# Patient Record
Sex: FEMALE | Race: ASIAN | Employment: FULL TIME | URBAN - METROPOLITAN AREA
[De-identification: names, ages, dates, MRNs, and addresses within clinical notes are randomized per-mention and may not be internally consistent; named-entity substitution may affect disease eponyms.]

---

## 2023-12-11 DIAGNOSIS — O36.80X0 PREGNANCY WITH INCONCLUSIVE FETAL VIABILITY, SINGLE OR UNSPECIFIED FETUS: Primary | ICD-10-CM

## 2023-12-20 ENCOUNTER — LAB ENCOUNTER (OUTPATIENT)
Dept: LAB | Facility: HOSPITAL | Age: 33
End: 2023-12-20
Attending: OBSTETRICS & GYNECOLOGY
Payer: COMMERCIAL

## 2023-12-20 ENCOUNTER — INITIAL PRENATAL (OUTPATIENT)
Facility: CLINIC | Age: 33
End: 2023-12-20
Payer: COMMERCIAL

## 2023-12-20 ENCOUNTER — ULTRASOUND ENCOUNTER (OUTPATIENT)
Facility: CLINIC | Age: 33
End: 2023-12-20
Payer: COMMERCIAL

## 2023-12-20 VITALS
HEART RATE: 78 BPM | DIASTOLIC BLOOD PRESSURE: 60 MMHG | BODY MASS INDEX: 22.6 KG/M2 | WEIGHT: 144 LBS | HEIGHT: 67 IN | SYSTOLIC BLOOD PRESSURE: 100 MMHG

## 2023-12-20 DIAGNOSIS — O36.80X0 PREGNANCY WITH INCONCLUSIVE FETAL VIABILITY, SINGLE OR UNSPECIFIED FETUS: ICD-10-CM

## 2023-12-20 DIAGNOSIS — Z34.01 ENCOUNTER FOR SUPERVISION OF NORMAL FIRST PREGNANCY IN FIRST TRIMESTER: Primary | ICD-10-CM

## 2023-12-20 DIAGNOSIS — Z34.01 ENCOUNTER FOR SUPERVISION OF NORMAL FIRST PREGNANCY IN FIRST TRIMESTER: ICD-10-CM

## 2023-12-20 LAB
ANTIBODY SCREEN: NEGATIVE
APPEARANCE: CLEAR
BASOPHILS # BLD AUTO: 0.02 X10(3) UL (ref 0–0.2)
BASOPHILS NFR BLD AUTO: 0.2 %
BILIRUBIN: NEGATIVE
EOSINOPHIL # BLD AUTO: 0.06 X10(3) UL (ref 0–0.7)
EOSINOPHIL NFR BLD AUTO: 0.6 %
ERYTHROCYTE [DISTWIDTH] IN BLOOD BY AUTOMATED COUNT: 12.3 %
GLUCOSE (URINE DIPSTICK): NEGATIVE MG/DL
HBV SURFACE AG SER-ACNC: <0.1 [IU]/L
HBV SURFACE AG SERPL QL IA: NONREACTIVE
HCT VFR BLD AUTO: 34.5 %
HCV AB SERPL QL IA: NONREACTIVE
HGB BLD-MCNC: 12.4 G/DL
IMM GRANULOCYTES # BLD AUTO: 0.08 X10(3) UL (ref 0–1)
IMM GRANULOCYTES NFR BLD: 0.8 %
KETONES (URINE DIPSTICK): NEGATIVE MG/DL
LYMPHOCYTES # BLD AUTO: 2.39 X10(3) UL (ref 1–4)
LYMPHOCYTES NFR BLD AUTO: 24.4 %
MCH RBC QN AUTO: 29.8 PG (ref 26–34)
MCHC RBC AUTO-ENTMCNC: 35.9 G/DL (ref 31–37)
MCV RBC AUTO: 82.9 FL
MONOCYTES # BLD AUTO: 0.44 X10(3) UL (ref 0.1–1)
MONOCYTES NFR BLD AUTO: 4.5 %
MULTISTIX LOT#: ABNORMAL NUMERIC
NEUTROPHILS # BLD AUTO: 6.82 X10 (3) UL (ref 1.5–7.7)
NEUTROPHILS # BLD AUTO: 6.82 X10(3) UL (ref 1.5–7.7)
NEUTROPHILS NFR BLD AUTO: 69.5 %
NITRITE, URINE: NEGATIVE
OCCULT BLOOD: NEGATIVE
PH, URINE: 7 (ref 4.5–8)
PLATELET # BLD AUTO: 231 10(3)UL (ref 150–450)
PROTEIN (URINE DIPSTICK): NEGATIVE MG/DL
RBC # BLD AUTO: 4.16 X10(6)UL
RH BLOOD TYPE: POSITIVE
RUBV IGG SER QL: POSITIVE
RUBV IGG SER-ACNC: 80 IU/ML (ref 10–?)
SPECIFIC GRAVITY: 1.01 (ref 1–1.03)
T PALLIDUM AB SER QL IA: NONREACTIVE
URINE-COLOR: YELLOW
UROBILINOGEN,SEMI-QN: 0.2 MG/DL (ref 0–1.9)
WBC # BLD AUTO: 9.8 X10(3) UL (ref 4–11)

## 2023-12-20 PROCEDURE — 85025 COMPLETE CBC W/AUTO DIFF WBC: CPT

## 2023-12-20 PROCEDURE — 3078F DIAST BP <80 MM HG: CPT | Performed by: OBSTETRICS & GYNECOLOGY

## 2023-12-20 PROCEDURE — 86803 HEPATITIS C AB TEST: CPT

## 2023-12-20 PROCEDURE — 3008F BODY MASS INDEX DOCD: CPT | Performed by: OBSTETRICS & GYNECOLOGY

## 2023-12-20 PROCEDURE — 86780 TREPONEMA PALLIDUM: CPT

## 2023-12-20 PROCEDURE — 86900 BLOOD TYPING SEROLOGIC ABO: CPT

## 2023-12-20 PROCEDURE — 3074F SYST BP LT 130 MM HG: CPT | Performed by: OBSTETRICS & GYNECOLOGY

## 2023-12-20 PROCEDURE — 87591 N.GONORRHOEAE DNA AMP PROB: CPT | Performed by: OBSTETRICS & GYNECOLOGY

## 2023-12-20 PROCEDURE — 86901 BLOOD TYPING SEROLOGIC RH(D): CPT

## 2023-12-20 PROCEDURE — 36415 COLL VENOUS BLD VENIPUNCTURE: CPT

## 2023-12-20 PROCEDURE — 87491 CHLMYD TRACH DNA AMP PROBE: CPT | Performed by: OBSTETRICS & GYNECOLOGY

## 2023-12-20 PROCEDURE — 87389 HIV-1 AG W/HIV-1&-2 AB AG IA: CPT

## 2023-12-20 PROCEDURE — 87086 URINE CULTURE/COLONY COUNT: CPT | Performed by: OBSTETRICS & GYNECOLOGY

## 2023-12-20 PROCEDURE — 76801 OB US < 14 WKS SINGLE FETUS: CPT | Performed by: OBSTETRICS & GYNECOLOGY

## 2023-12-20 PROCEDURE — 81002 URINALYSIS NONAUTO W/O SCOPE: CPT | Performed by: OBSTETRICS & GYNECOLOGY

## 2023-12-20 PROCEDURE — 86850 RBC ANTIBODY SCREEN: CPT

## 2023-12-20 PROCEDURE — 87340 HEPATITIS B SURFACE AG IA: CPT

## 2023-12-20 PROCEDURE — 86762 RUBELLA ANTIBODY: CPT

## 2023-12-20 NOTE — PROGRESS NOTES
New OB  - patient c/o nausea - declines medications  - denies h/o HSV, blood transfusion, hepatitis  - EDC by LMP c/w 9w5d US  - patient had normal pap smear in [place] 9/23 - repeat after delivery  - discussed NIPT and carrier screen - patient will consider

## 2023-12-21 LAB
C TRACH DNA SPEC QL NAA+PROBE: NEGATIVE
N GONORRHOEA DNA SPEC QL NAA+PROBE: NEGATIVE

## 2024-01-18 ENCOUNTER — TELEPHONE (OUTPATIENT)
Facility: CLINIC | Age: 34
End: 2024-01-18

## 2024-01-18 ENCOUNTER — ROUTINE PRENATAL (OUTPATIENT)
Facility: CLINIC | Age: 34
End: 2024-01-18
Payer: COMMERCIAL

## 2024-01-18 VITALS
WEIGHT: 145.19 LBS | DIASTOLIC BLOOD PRESSURE: 56 MMHG | HEIGHT: 67 IN | HEART RATE: 82 BPM | SYSTOLIC BLOOD PRESSURE: 98 MMHG | BODY MASS INDEX: 22.79 KG/M2

## 2024-01-18 DIAGNOSIS — Z34.01 ENCOUNTER FOR SUPERVISION OF NORMAL FIRST PREGNANCY IN FIRST TRIMESTER: Primary | ICD-10-CM

## 2024-01-18 DIAGNOSIS — Z3A.13 13 WEEKS GESTATION OF PREGNANCY: ICD-10-CM

## 2024-01-18 PROCEDURE — 3074F SYST BP LT 130 MM HG: CPT | Performed by: OBSTETRICS & GYNECOLOGY

## 2024-01-18 PROCEDURE — 3078F DIAST BP <80 MM HG: CPT | Performed by: OBSTETRICS & GYNECOLOGY

## 2024-01-18 PROCEDURE — 3008F BODY MASS INDEX DOCD: CPT | Performed by: OBSTETRICS & GYNECOLOGY

## 2024-01-18 NOTE — PROGRESS NOTES
Patient has no complaints    - EDC by LMP c/w 9w5d US  - patient had normal pap smear in [place] 9/23 - repeat after delivery  -  NIPT and carrier screen - today

## 2024-01-18 NOTE — TELEPHONE ENCOUNTER
13 6/7 G1 [place] pregnancy    Pt request for NIPS/carrier lab draw per Dr. Steen     Patients name &  verified on lab tubes with patient. NIPS/carrier screen lab drawn, patient tolerated well. Specimen placed at . INVITAE access, call in 2/4 weeks for result, Cautioned patient may receive results via email from [facility] ?E that includes gender results discussed. Patient verbalized understanding.

## 2024-01-28 LAB
AMB EXT MYRIAD TRISOMY 13: NEGATIVE
AMB EXT MYRIAD TRISOMY 18: NEGATIVE
AMB EXT MYRIAD TRISOMY 21: NEGATIVE

## 2024-01-29 ENCOUNTER — TELEPHONE (OUTPATIENT)
Facility: CLINIC | Age: 34
End: 2024-01-29

## 2024-02-01 LAB
AMB EXT CYSTIC FIBROSIS ALLELE 1: NEGATIVE
AMB EXT SICKLE CELL SOLUBILITY: NEGATIVE

## 2024-02-16 ENCOUNTER — ROUTINE PRENATAL (OUTPATIENT)
Facility: CLINIC | Age: 34
End: 2024-02-16
Payer: COMMERCIAL

## 2024-02-16 VITALS
HEIGHT: 67 IN | WEIGHT: 148 LBS | HEART RATE: 75 BPM | BODY MASS INDEX: 23.23 KG/M2 | DIASTOLIC BLOOD PRESSURE: 58 MMHG | SYSTOLIC BLOOD PRESSURE: 108 MMHG

## 2024-02-16 DIAGNOSIS — Z3A.18 PREGNANCY WITH 18 COMPLETED WEEKS GESTATION: Primary | ICD-10-CM

## 2024-02-16 DIAGNOSIS — Z34.02 ENCOUNTER FOR SUPERVISION OF NORMAL FIRST PREGNANCY IN SECOND TRIMESTER: ICD-10-CM

## 2024-02-16 DIAGNOSIS — Z36.89 ENCOUNTER FOR FETAL ANATOMIC SURVEY (HCC): ICD-10-CM

## 2024-02-16 NOTE — PROGRESS NOTES
ADILSON 18.0    Feel really tired.    No nausea  No LOF, VB, ctx.  Has not felt baby move yet.      - EDC by LMP c/w 9w5d US  - patient had normal pap smear in [place] 9/23 - repeat after delivery  - NIPT and carrier screen WNL  - FOB: Dustin  - Baby boy!  [ ] follow up anatomy scan (ordered 2/16)

## 2024-03-15 ENCOUNTER — ULTRASOUND ENCOUNTER (OUTPATIENT)
Facility: CLINIC | Age: 34
End: 2024-03-15
Payer: COMMERCIAL

## 2024-03-15 ENCOUNTER — ROUTINE PRENATAL (OUTPATIENT)
Facility: CLINIC | Age: 34
End: 2024-03-15
Payer: COMMERCIAL

## 2024-03-15 VITALS
DIASTOLIC BLOOD PRESSURE: 68 MMHG | WEIGHT: 155.25 LBS | BODY MASS INDEX: 24.37 KG/M2 | SYSTOLIC BLOOD PRESSURE: 106 MMHG | HEART RATE: 67 BPM | HEIGHT: 67 IN

## 2024-03-15 DIAGNOSIS — Z34.90 PRENATAL CARE, ANTEPARTUM (HCC): Primary | ICD-10-CM

## 2024-03-15 DIAGNOSIS — Z3A.18: ICD-10-CM

## 2024-03-15 PROCEDURE — 76805 OB US >/= 14 WKS SNGL FETUS: CPT | Performed by: STUDENT IN AN ORGANIZED HEALTH CARE EDUCATION/TRAINING PROGRAM

## 2024-03-15 NOTE — PROGRESS NOTES
ADILSON - 22w0d     Pt doing well overall  Has low back pain, sciatica pain. Offered PT, pt declines for now  Feeling fetal movement    - EDC by LMP c/w 9w5d US  - patient had normal pap smear in [place] 9/23 - repeat after delivery  - NIPT and carrier screen WNL  - FOB: Dustin  - Baby boy!  - anatomy US done today but images not uploaded to Epic yet, per sonographer there was intracardiac echogenic focus but otherwise normal (ICEF clinically not significant with normal NIPT)

## 2024-03-18 ENCOUNTER — TELEPHONE (OUTPATIENT)
Dept: ADMINISTRATIVE | Age: 34
End: 2024-03-18

## 2024-03-22 NOTE — TELEPHONE ENCOUNTER
FMLA ([employer]) with incomplete AGUS/FCR (missing fax number) and disability form ([employer]) with valid authorization received at Forms Department on 3-15/2024. Logged for processing.    - On AGUS/FCR for [employer], PT states she wants form to be uploaded via [employer].

## 2024-03-25 NOTE — TELEPHONE ENCOUNTER
Hu Smallwood, Dr. Lucero -      Please sign off on these 2 forms if you agree to:      STD - Maternity Leave       Starting: By or near 2024 (estimated due date)       Ending:  Pending recovery - 6 weeks vaginal / 8 weeks     FMLA - Maternity Leave       Starting: By or near 2024 (estimated due date)       Ending: Pending recovery - 6 weeks vaginal / 8 weeks     (place your signature on the first page only)     -From your Inbasket, Highlight the patient and click \"Chart\"   -Double click the 3/18/2024 Forms Completion telephone encounter  -Scroll down to the Media section   -Click the blue Hyperlinks:     STD / Dr. Lucero / 3-    FMLA / Dr. Lucero / 3-    -Click Acknowledge located in the top right ribbon/menu (it  has been moved)  -Drag the mouse into the blank space of the document and a + sign will   appear. Left click to electronically sign the document.     Thank you for your time and assistance!     Tavia Laughlin Department

## 2024-04-11 PROBLEM — R87.610 ATYPICAL SQUAMOUS CELL CHANGES OF UNDETERMINED SIGNIFICANCE (ASCUS) ON CERVICAL CYTOLOGY WITH POSITIVE HIGH RISK HUMAN PAPILLOMA VIRUS (HPV): Status: ACTIVE | Noted: 2022-01-12

## 2024-04-11 PROBLEM — O26.892 LOW BACK PAIN DURING PREGNANCY IN SECOND TRIMESTER (HCC): Status: ACTIVE | Noted: 2024-04-11

## 2024-04-11 PROBLEM — O99.891 DISORDER OF MUSCULOSKELETAL SYSTEM DURING PREGNANCY (HCC): Status: ACTIVE | Noted: 2024-04-11

## 2024-04-11 PROBLEM — M54.50 LOW BACK PAIN DURING PREGNANCY IN SECOND TRIMESTER (HCC): Status: ACTIVE | Noted: 2024-04-11

## 2024-04-11 PROBLEM — R87.810 ATYPICAL SQUAMOUS CELL CHANGES OF UNDETERMINED SIGNIFICANCE (ASCUS) ON CERVICAL CYTOLOGY WITH POSITIVE HIGH RISK HUMAN PAPILLOMA VIRUS (HPV): Status: ACTIVE | Noted: 2022-01-12

## 2024-04-11 PROBLEM — Z34.02 PREGNANCY, FIRST, SECOND TRIMESTER (HCC): Status: ACTIVE | Noted: 2024-04-11

## 2024-04-12 ENCOUNTER — ROUTINE PRENATAL (OUTPATIENT)
Facility: CLINIC | Age: 34
End: 2024-04-12
Payer: COMMERCIAL

## 2024-04-12 VITALS
DIASTOLIC BLOOD PRESSURE: 62 MMHG | HEIGHT: 67 IN | WEIGHT: 163.19 LBS | HEART RATE: 89 BPM | BODY MASS INDEX: 25.61 KG/M2 | SYSTOLIC BLOOD PRESSURE: 100 MMHG

## 2024-04-12 DIAGNOSIS — Z11.4 SCREENING FOR HIV (HUMAN IMMUNODEFICIENCY VIRUS): ICD-10-CM

## 2024-04-12 DIAGNOSIS — Z13.0 SCREENING, ANEMIA, DEFICIENCY, IRON: ICD-10-CM

## 2024-04-12 DIAGNOSIS — Z34.02 PREGNANCY, FIRST, SECOND TRIMESTER (HCC): ICD-10-CM

## 2024-04-12 DIAGNOSIS — Z13.1 SCREENING FOR DIABETES MELLITUS: ICD-10-CM

## 2024-04-12 DIAGNOSIS — J06.9 UPPER RESPIRATORY TRACT INFECTION, UNSPECIFIED TYPE: ICD-10-CM

## 2024-04-12 DIAGNOSIS — M54.50 LOW BACK PAIN DURING PREGNANCY IN SECOND TRIMESTER (HCC): Primary | ICD-10-CM

## 2024-04-12 DIAGNOSIS — O26.892 LOW BACK PAIN DURING PREGNANCY IN SECOND TRIMESTER (HCC): Primary | ICD-10-CM

## 2024-04-12 DIAGNOSIS — O99.891 DISORDER OF MUSCULOSKELETAL SYSTEM DURING PREGNANCY (HCC): ICD-10-CM

## 2024-04-12 NOTE — PATIENT INSTRUCTIONS
Next blood work at 27-28 wk    Third trimester HIV testing  [place] law mandates every pregnant woman is tested for HIV once at the start of prenatal care and again in the 3rd trimester (27 weeks 0 days and beyond).     Tdap (Tetanus, Diptheria, Pertussis)   -booster shot for pertussis (whooping cough)  -recommended by the CDC (Centers for Disease Control) for every pregnant woman in the third trimester (28 weeks and beyond)  -the purpose of giving the vaccine during pregnancy is so that the mother can share her antibodies with the fetus across the placenta  -it is recommended to take this vaccine early in the third trimester so that your body has enough time to produce the antibodies that can pass across the placenta to the fetus  -the infant cannot be vaccinated for pertussis until 2 months of age due to an immature immune system and lack of response to the vaccine prior to that time.   -the father of the baby as well as grandparents, other caregivers, etc should receive a booster shot for whooping cough as well (vaccination at least 1 time after the age of 18 is sufficient)     [facility] Prenatal Classes (and virtual tour of Labor & Delivery unit)  [email or web address]  [phone]    Pre-registration for the hospital  [email or web address]    Breast pump  -contact your insurance to request them to send an order to us for their preferred medical supply company  Or  -contact [employer] (flyer available on the lobby wall across from reception desk)   [email or web address]    Car seat *MUST* be installed before infant(s) can be leave the hospital    Doctor for baby   *Please select a pediatrician or family medicine provider BEFORE you are admitted to the hospital to give birth.   Pediatrics (birth-18 years of age) or Family Medicine (birth through adulthood)  Make sure that provider accepts your insurance.   Pick a provider that is close to where  you live.  If the provider is on staff at [facility], the nursing staff will notify them when your infant is born so they are aware to come to the hospital to examine the infant.   If the provider you have chosen is not on staff at [facility], a pediatric hospitalist will care for your infant during the hospitalization only. You must arrange the follow up visit with your provider.   After delivery at the hospital follow up visit instructions for baby will be provided prior to discharge.     The baby will not be able to leave the hospital without a follow up visit scheduled.     To establish care:  [email or web address]  Or   [facility] Physician Referral line at [phone]    There are MANY providers available. It would be impossible to list them all, but here are a few popular pediatrics groups in [place]:    [facility] [place] [phone]  [facility] [place] [phone]  [facility] [place] [phone]  [facility] [place] [phone]  [facility] [place] [phone]  [facility] [phone]    There are also many wonderful independent practitioners as well. We recommend you speak with family, friends, colleagues as personal recommendations can be very helpful.

## 2024-04-12 NOTE — PROGRESS NOTES
ADILSON    Chief Complaint   Patient presents with    Prenatal Care     ADILSON-26w 0d    Patient reports she currently has cough,sore throat, congestion that started on 24. Patient reports baby is very active.      Partner here - he says he was sick   +FM   C/o cough, sore throat, congestion starting 24   Has NOT seen PCP or Urgent care  Has NOT had testing for COVID or Flu  Symptoms are improving  Fever N  Patient is actively still having some coughing in the room  Taking nothing for her symptoms. Does have Robitussin DM though that she could take   Probably Viral URI.     33 year old  at 26w0d  KIMMIE 24 by LMP c/w 9w5d US  O+    - NIPS and carrier screen WNL  - FOB: Dustin  - Baby boy!  -Normal anatomy ultrasound. Isolated echogenic intracardiac focus - in context of normal NIPT testing, this is not clinically significant.   -Tdap discussed  -1 hr GTT, CBC, 3rd trim HIV discussed & ordered  -classes, pre-registration, pediatrician, breast pump, car seat discussed    Low back pain, sciatica  -PT declined    Patient had normal pap smear in [place]  - repeat after delivery    RTC 2 wk

## 2024-04-20 ENCOUNTER — LAB ENCOUNTER (OUTPATIENT)
Dept: LAB | Facility: HOSPITAL | Age: 34
End: 2024-04-20
Attending: OBSTETRICS & GYNECOLOGY
Payer: COMMERCIAL

## 2024-04-20 DIAGNOSIS — Z11.3 SCREENING EXAMINATION FOR STD (SEXUALLY TRANSMITTED DISEASE): ICD-10-CM

## 2024-04-20 DIAGNOSIS — Z13.1 SCREENING FOR DIABETES MELLITUS: ICD-10-CM

## 2024-04-20 DIAGNOSIS — Z11.4 SCREENING FOR HIV (HUMAN IMMUNODEFICIENCY VIRUS): ICD-10-CM

## 2024-04-20 DIAGNOSIS — Z13.0 SCREENING, ANEMIA, DEFICIENCY, IRON: ICD-10-CM

## 2024-04-20 LAB
BASOPHILS # BLD AUTO: 0.06 X10(3) UL (ref 0–0.2)
BASOPHILS NFR BLD AUTO: 0.6 %
EOSINOPHIL # BLD AUTO: 0.07 X10(3) UL (ref 0–0.7)
EOSINOPHIL NFR BLD AUTO: 0.7 %
ERYTHROCYTE [DISTWIDTH] IN BLOOD BY AUTOMATED COUNT: 13.1 %
GLUCOSE 1H P GLC SERPL-MCNC: 121 MG/DL
HCT VFR BLD AUTO: 31.2 %
HGB BLD-MCNC: 10.8 G/DL
IMM GRANULOCYTES # BLD AUTO: 0.46 X10(3) UL (ref 0–1)
IMM GRANULOCYTES NFR BLD: 4.3 %
LYMPHOCYTES # BLD AUTO: 1.55 X10(3) UL (ref 1–4)
LYMPHOCYTES NFR BLD AUTO: 14.4 %
MCH RBC QN AUTO: 30.5 PG (ref 26–34)
MCHC RBC AUTO-ENTMCNC: 34.6 G/DL (ref 31–37)
MCV RBC AUTO: 88.1 FL
MONOCYTES # BLD AUTO: 0.43 X10(3) UL (ref 0.1–1)
MONOCYTES NFR BLD AUTO: 4 %
NEUTROPHILS # BLD AUTO: 8.17 X10 (3) UL (ref 1.5–7.7)
NEUTROPHILS # BLD AUTO: 8.17 X10(3) UL (ref 1.5–7.7)
NEUTROPHILS NFR BLD AUTO: 76 %
PLATELET # BLD AUTO: 214 10(3)UL (ref 150–450)
RBC # BLD AUTO: 3.54 X10(6)UL
WBC # BLD AUTO: 10.7 X10(3) UL (ref 4–11)

## 2024-04-20 PROCEDURE — 36415 COLL VENOUS BLD VENIPUNCTURE: CPT

## 2024-04-20 PROCEDURE — 82950 GLUCOSE TEST: CPT

## 2024-04-20 PROCEDURE — 85025 COMPLETE CBC W/AUTO DIFF WBC: CPT

## 2024-04-20 PROCEDURE — 86780 TREPONEMA PALLIDUM: CPT

## 2024-04-20 PROCEDURE — 87389 HIV-1 AG W/HIV-1&-2 AB AG IA: CPT

## 2024-04-21 PROBLEM — O99.012 ANEMIA AFFECTING PREGNANCY IN SECOND TRIMESTER (HCC): Status: ACTIVE | Noted: 2024-04-21

## 2024-04-21 LAB — T PALLIDUM AB SER QL IA: NONREACTIVE

## 2024-04-22 ENCOUNTER — LAB ENCOUNTER (OUTPATIENT)
Dept: LAB | Facility: HOSPITAL | Age: 34
End: 2024-04-22
Attending: OBSTETRICS & GYNECOLOGY
Payer: COMMERCIAL

## 2024-04-22 DIAGNOSIS — Z13.0 SCREENING, ANEMIA, DEFICIENCY, IRON: ICD-10-CM

## 2024-04-22 DIAGNOSIS — O99.012 ANEMIA AFFECTING PREGNANCY IN SECOND TRIMESTER (HCC): ICD-10-CM

## 2024-04-22 PROBLEM — E61.1 IRON DEFICIENCY: Status: ACTIVE | Noted: 2024-04-22

## 2024-04-22 LAB
BASOPHILS # BLD AUTO: 0.08 X10(3) UL (ref 0–0.2)
BASOPHILS NFR BLD AUTO: 0.7 %
DEPRECATED HBV CORE AB SER IA-ACNC: 13.3 NG/ML
EOSINOPHIL # BLD AUTO: 0.05 X10(3) UL (ref 0–0.7)
EOSINOPHIL NFR BLD AUTO: 0.4 %
ERYTHROCYTE [DISTWIDTH] IN BLOOD BY AUTOMATED COUNT: 13.2 %
HCT VFR BLD AUTO: 33.8 %
HGB BLD-MCNC: 11.3 G/DL
IMM GRANULOCYTES # BLD AUTO: 0.49 X10(3) UL (ref 0–1)
IMM GRANULOCYTES NFR BLD: 4 %
LYMPHOCYTES # BLD AUTO: 1.83 X10(3) UL (ref 1–4)
LYMPHOCYTES NFR BLD AUTO: 15.1 %
MCH RBC QN AUTO: 30.4 PG (ref 26–34)
MCHC RBC AUTO-ENTMCNC: 33.4 G/DL (ref 31–37)
MCV RBC AUTO: 90.9 FL
MONOCYTES # BLD AUTO: 0.59 X10(3) UL (ref 0.1–1)
MONOCYTES NFR BLD AUTO: 4.9 %
NEUTROPHILS # BLD AUTO: 9.11 X10 (3) UL (ref 1.5–7.7)
NEUTROPHILS # BLD AUTO: 9.11 X10(3) UL (ref 1.5–7.7)
NEUTROPHILS NFR BLD AUTO: 74.9 %
PLATELET # BLD AUTO: 228 10(3)UL (ref 150–450)
RBC # BLD AUTO: 3.72 X10(6)UL
WBC # BLD AUTO: 12.2 X10(3) UL (ref 4–11)

## 2024-04-22 PROCEDURE — 85025 COMPLETE CBC W/AUTO DIFF WBC: CPT

## 2024-04-22 PROCEDURE — 82728 ASSAY OF FERRITIN: CPT

## 2024-04-22 PROCEDURE — 36415 COLL VENOUS BLD VENIPUNCTURE: CPT

## 2024-04-26 ENCOUNTER — ROUTINE PRENATAL (OUTPATIENT)
Facility: CLINIC | Age: 34
End: 2024-04-26
Payer: COMMERCIAL

## 2024-04-26 VITALS
WEIGHT: 170 LBS | BODY MASS INDEX: 26.68 KG/M2 | DIASTOLIC BLOOD PRESSURE: 74 MMHG | SYSTOLIC BLOOD PRESSURE: 110 MMHG | HEART RATE: 82 BPM | HEIGHT: 67 IN

## 2024-04-26 DIAGNOSIS — D50.9 IRON DEFICIENCY ANEMIA DURING PREGNANCY (HCC): ICD-10-CM

## 2024-04-26 DIAGNOSIS — O99.019 IRON DEFICIENCY ANEMIA DURING PREGNANCY (HCC): ICD-10-CM

## 2024-04-26 DIAGNOSIS — Z34.90 PRENATAL CARE, ANTEPARTUM (HCC): ICD-10-CM

## 2024-04-26 DIAGNOSIS — Z3A.28 28 WEEKS GESTATION OF PREGNANCY (HCC): Primary | ICD-10-CM

## 2024-04-26 PROCEDURE — 90471 IMMUNIZATION ADMIN: CPT | Performed by: STUDENT IN AN ORGANIZED HEALTH CARE EDUCATION/TRAINING PROGRAM

## 2024-04-26 PROCEDURE — 90715 TDAP VACCINE 7 YRS/> IM: CPT | Performed by: STUDENT IN AN ORGANIZED HEALTH CARE EDUCATION/TRAINING PROGRAM

## 2024-04-26 RX ORDER — PNV NO.95/FERROUS FUM/FOLIC AC 28MG-0.8MG
TABLET ORAL
COMMUNITY

## 2024-04-26 NOTE — PROGRESS NOTES
ADILSON 28.0  +FM, no LOF, no VB, no ctx.      Chief Complaint   Patient presents with    Prenatal Care     ADILSON 28w 0d  No complaints      Partner here - he says he was sick   +FM   C/o cough, sore throat, congestion starting 24   Has NOT seen PCP or Urgent care  Has NOT had testing for COVID or Flu  Symptoms are improving  Fever N  Patient is actively still having some coughing in the room  Taking nothing for her symptoms. Does have Robitussin DM though that she could take   Probably Viral URI.     33 year old  at 26w0d  KIMMIE 24 by LMP c/w 9w5d US  O+    - NIPS and carrier screen WNL  - FOB: Dustin  - Baby boy!  -Normal anatomy ultrasound. Isolated echogenic intracardiac focus - in context of normal NIPT testing, this is not clinically significant.   -Tdap today  -classes, pre-registration, pediatrician, breast pump, car seat discussed    Low back pain, sciatica  -PT declined    Iron deficiency anemia  -hgb 11.3, ferritin     Patient had normal pap smear in [place]  - repeat after delivery    RTC 2 wk

## 2024-05-07 ENCOUNTER — TELEPHONE (OUTPATIENT)
Facility: CLINIC | Age: 34
End: 2024-05-07

## 2024-05-07 NOTE — TELEPHONE ENCOUNTER
Breast Pump order was received from [employer].  Order form was placed in Dr. Nicole Lucero's bin for signature.

## 2024-05-10 ENCOUNTER — ROUTINE PRENATAL (OUTPATIENT)
Facility: CLINIC | Age: 34
End: 2024-05-10
Payer: COMMERCIAL

## 2024-05-10 VITALS
WEIGHT: 171 LBS | SYSTOLIC BLOOD PRESSURE: 98 MMHG | HEART RATE: 71 BPM | BODY MASS INDEX: 26.84 KG/M2 | HEIGHT: 67 IN | DIASTOLIC BLOOD PRESSURE: 66 MMHG

## 2024-05-10 DIAGNOSIS — Z34.90 PRENATAL CARE, ANTEPARTUM (HCC): Primary | ICD-10-CM

## 2024-05-10 NOTE — PROGRESS NOTES
ADILSON   It is hard to sleep sometimes because the baby is moving so much at night - reassured      33 year old  at 30w0d   KIMMIE 24 by LMP c/w 9w5d US  O+    - NIPS and carrier screen WNL  - FOB: Dustin  - Baby boy!  -Normal anatomy ultrasound. Isolated echogenic intracardiac focus - in context of normal NIPT testing, this is not clinically significant.   -1h GTT, HIV, syphilis screen negative  -Tdap done  -classes, pre-registration, pediatrician, breast pump, car seat discussed    Low back pain, sciatica  -PT declined    Iron deficiency anemia  -hgb 10.8 on , ferritin 13 - advised supplemental PO Fe, she is taking    Patient had normal pap smear in [place]  - repeat after delivery    RTC 2 wk

## 2024-05-20 ENCOUNTER — LAB ENCOUNTER (OUTPATIENT)
Dept: LAB | Facility: HOSPITAL | Age: 34
End: 2024-05-20
Attending: OBSTETRICS & GYNECOLOGY

## 2024-05-20 DIAGNOSIS — E61.1 IRON DEFICIENCY: ICD-10-CM

## 2024-05-20 DIAGNOSIS — Z11.3 SCREENING EXAMINATION FOR STD (SEXUALLY TRANSMITTED DISEASE): ICD-10-CM

## 2024-05-20 LAB
BASOPHILS # BLD AUTO: 0.06 X10(3) UL (ref 0–0.2)
BASOPHILS NFR BLD AUTO: 0.6 %
EOSINOPHIL # BLD AUTO: 0.06 X10(3) UL (ref 0–0.7)
EOSINOPHIL NFR BLD AUTO: 0.6 %
ERYTHROCYTE [DISTWIDTH] IN BLOOD BY AUTOMATED COUNT: 13.2 %
HCT VFR BLD AUTO: 37.3 %
HGB BLD-MCNC: 12.4 G/DL
IMM GRANULOCYTES # BLD AUTO: 0.41 X10(3) UL (ref 0–1)
IMM GRANULOCYTES NFR BLD: 4 %
LYMPHOCYTES # BLD AUTO: 1.48 X10(3) UL (ref 1–4)
LYMPHOCYTES NFR BLD AUTO: 14.6 %
MCH RBC QN AUTO: 30.6 PG (ref 26–34)
MCHC RBC AUTO-ENTMCNC: 33.2 G/DL (ref 31–37)
MCV RBC AUTO: 92.1 FL
MONOCYTES # BLD AUTO: 0.55 X10(3) UL (ref 0.1–1)
MONOCYTES NFR BLD AUTO: 5.4 %
NEUTROPHILS # BLD AUTO: 7.61 X10 (3) UL (ref 1.5–7.7)
NEUTROPHILS # BLD AUTO: 7.61 X10(3) UL (ref 1.5–7.7)
NEUTROPHILS NFR BLD AUTO: 74.8 %
PLATELET # BLD AUTO: 198 10(3)UL (ref 150–450)
RBC # BLD AUTO: 4.05 X10(6)UL
T PALLIDUM AB SER QL IA: NONREACTIVE
WBC # BLD AUTO: 10.2 X10(3) UL (ref 4–11)

## 2024-05-20 PROCEDURE — 36415 COLL VENOUS BLD VENIPUNCTURE: CPT

## 2024-05-20 PROCEDURE — 85025 COMPLETE CBC W/AUTO DIFF WBC: CPT

## 2024-05-20 PROCEDURE — 86780 TREPONEMA PALLIDUM: CPT

## 2024-05-24 ENCOUNTER — ROUTINE PRENATAL (OUTPATIENT)
Facility: CLINIC | Age: 34
End: 2024-05-24

## 2024-05-24 VITALS
WEIGHT: 173 LBS | SYSTOLIC BLOOD PRESSURE: 98 MMHG | HEART RATE: 97 BPM | BODY MASS INDEX: 27.15 KG/M2 | HEIGHT: 67 IN | DIASTOLIC BLOOD PRESSURE: 60 MMHG

## 2024-05-24 DIAGNOSIS — Z34.90 PRENATAL CARE, ANTEPARTUM (HCC): Primary | ICD-10-CM

## 2024-05-24 NOTE — PROGRESS NOTES
ADILSON   Sharp pain in pubic area with certain movements, gets better with walking and yoga ball - reassured  Baby active  Taking unisom for sleep - reassured this is OK    33 year old  at 32w0d   KIMMIE 24 by LMP c/w 9w5d US  O+    - NIPS and carrier screen WNL  - FOB: Dustin  - Baby boy!  -Normal anatomy ultrasound. Isolated echogenic intracardiac focus - in context of normal NIPT testing, this is not clinically significant.   -1h GTT, HIV, syphilis screen negative  -Tdap done  -classes, pre-registration, pediatrician, breast pump, car seat discussed    Low back pain, sciatica  -PT declined    Iron deficiency anemia  -hgb 10.8 on , ferritin 13 - advised supplemental PO Fe, she is taking    Patient had normal pap smear in [place]  - repeat after delivery    RTC 2 wk

## 2024-06-07 ENCOUNTER — ROUTINE PRENATAL (OUTPATIENT)
Facility: CLINIC | Age: 34
End: 2024-06-07
Payer: COMMERCIAL

## 2024-06-07 VITALS
HEART RATE: 102 BPM | BODY MASS INDEX: 27.37 KG/M2 | WEIGHT: 174.38 LBS | SYSTOLIC BLOOD PRESSURE: 120 MMHG | DIASTOLIC BLOOD PRESSURE: 74 MMHG | HEIGHT: 67 IN

## 2024-06-07 DIAGNOSIS — Z34.90 PRENATAL CARE, ANTEPARTUM (HCC): ICD-10-CM

## 2024-06-07 DIAGNOSIS — L50.9 URTICARIA: ICD-10-CM

## 2024-06-07 DIAGNOSIS — Z3A.34 34 WEEKS GESTATION OF PREGNANCY (HCC): Primary | ICD-10-CM

## 2024-06-07 RX ORDER — HYDROXYZINE HYDROCHLORIDE 25 MG/1
25 TABLET, FILM COATED ORAL 3 TIMES DAILY PRN
Qty: 30 TABLET | Refills: 0 | Status: SHIPPED | OUTPATIENT
Start: 2024-06-07

## 2024-06-07 NOTE — PROGRESS NOTES
ADILSON 34.0  Denies LOF, VB, ctx/cramping  +FM  Denies HA, VC, CP, SOB, RUQ pain  No heartburn, lots of movement near her diaphragm   Today patient is concern about  -rash in her hip areas - cortisol     33 year old  at 32w0d   KIMMIE 24 by LMP c/w 9w5d US  O+    - NIPS and carrier screen WNL  - FOB: Dustin  - Baby boy!  -Normal anatomy ultrasound. Isolated echogenic intracardiac focus - in context of normal NIPT testing, this is not clinically significant.   -1h GTT, HIV, syphilis screen negative  -Tdap done  -classes, pre-registration, pediatrician, breast pump, car seat discussed    Low back pain, sciatica  -PT declined    Iron deficiency anemia  -hgb 10.8 on , ferritin 13 - advised supplemental PO Fe, she is taking    Urticaria   - intermittent on bilateral hips - possible heat rash?  Not seen today in clinic but seen on photos and raised hives seen localized.  - hydroxizine sent to pharmacy     Patient had normal pap smear in [place]  - repeat after delivery    RTC 2 wk

## 2024-06-21 ENCOUNTER — ROUTINE PRENATAL (OUTPATIENT)
Facility: CLINIC | Age: 34
End: 2024-06-21

## 2024-06-21 VITALS
HEIGHT: 67 IN | DIASTOLIC BLOOD PRESSURE: 82 MMHG | SYSTOLIC BLOOD PRESSURE: 104 MMHG | BODY MASS INDEX: 27.62 KG/M2 | HEART RATE: 74 BPM | WEIGHT: 176 LBS

## 2024-06-21 DIAGNOSIS — E61.1 IRON DEFICIENCY: ICD-10-CM

## 2024-06-21 DIAGNOSIS — Z34.03 PREGNANCY, FIRST, THIRD TRIMESTER (HCC): ICD-10-CM

## 2024-06-21 DIAGNOSIS — O36.5930 POOR FETAL GROWTH AFFECTING MANAGEMENT OF MOTHER IN THIRD TRIMESTER, SINGLE OR UNSPECIFIED FETUS (HCC): Primary | ICD-10-CM

## 2024-06-21 DIAGNOSIS — O99.891 DISORDER OF MUSCULOSKELETAL SYSTEM DURING PREGNANCY (HCC): ICD-10-CM

## 2024-06-21 DIAGNOSIS — O26.03 EXCESSIVE WEIGHT GAIN DURING PREGNANCY IN THIRD TRIMESTER (HCC): ICD-10-CM

## 2024-06-21 DIAGNOSIS — O99.013 ANEMIA AFFECTING PREGNANCY IN THIRD TRIMESTER (HCC): ICD-10-CM

## 2024-06-21 PROCEDURE — 87081 CULTURE SCREEN ONLY: CPT | Performed by: OBSTETRICS & GYNECOLOGY

## 2024-06-21 PROCEDURE — 87150 DNA/RNA AMPLIFIED PROBE: CPT | Performed by: OBSTETRICS & GYNECOLOGY

## 2024-06-21 NOTE — PATIENT INSTRUCTIONS
Consider induction of labor at 39-40 wk  -Recent studies show improved outcomes for mothers and infants in most* pregnancies with delivery at 39-40 wk gestation. (*Excluding pre-eclampsia, fetal growth restriction, twins, etc)   -Fetal lung maturity should be adequate.   -The longer the pregnancy progresses, the fetus will continue to gain weight and will become relatively larger than it could be to the mother   -The longer the pregnancy progresses, the placenta will continue to age and become less efficient  -The longer the pregnancy progresses, there is increased risk for developing gestational hypertension/pre-eclampsia.     -\"Larger fetus\" is relative. Fetus has to be compatible with maternal pelvis. No one can predict this. Can only know while the patient is in labor  -Larger fetuses and placental insufficiency (too inefficient to adequately support the fetus with oxygen, nutrients, hydration) are major risk factors for  section.   -A fetus that cannot get its oxygen needs met during labor will manifest fetal distress/intolerance to labor and will require  section if remote from delivery  -A fetus that is larger and/or has broader shoulders/larger chest & abdominal circumference is at increased risk of shoulder dystocia, during which the fetal shoulder gets stuck on the maternal pubic bone. This can result in fetal and maternal injuries.   Potential fetal injuries:  -nerve damage to arm and neck (mainly temporary, rarely permanent)  -broken arm or clavicle  -low oxygen level that can result in brain damage, cerebral palsy, fetal/ death  Potential maternal injuries:   -third or fourth degree perineal lacerations  -tailbone injury  -need for emergent  section (higher risk for infection, hemorrhage, organ damage)   -A fetus that is too large to pass through the maternal bony pelvis will be delivered via unscheduled  section with higher risk of maternal and fetal infection  and hemorrhage.   -There is a lower  rate for inductions between 39-40 weeks compared with expectant management at that time.    [facility] Prenatal Classes (and virtual tour of Labor & Delivery unit)  [email or web address]  [phone]    Pre-registration for the hospital  [email or web address]    Breast pump  -contact your insurance to request them to send an order to us for their preferred medical supply company  Or  -contact [employer] (flyer available on the lobby wall across from reception desk)   [email or web address]    Car seat *MUST* be installed before infant(s) can be leave the hospital    Doctor for baby   *Please select a pediatrician or family medicine provider BEFORE you are admitted to the hospital to give birth.   Pediatrics (birth-18 years of age) or Family Medicine (birth through adulthood)  Make sure that provider accepts your insurance.   Pick a provider that is close to where you live.  If the provider is on staff at [facility], the nursing staff will notify them when your infant is born so they are aware to come to the hospital to examine the infant.   If the provider you have chosen is not on staff at [facility], a pediatric hospitalist will care for your infant during the hospitalization only. You must arrange the follow up visit with your provider.   After delivery at the hospital follow up visit instructions for baby will be provided prior to discharge.     The baby will not be able to leave the hospital without a follow up visit scheduled.     To establish care:  [email or web address]  Or   [facility] Physician Referral line at [phone]    There are MANY providers available. It would be impossible to list them all, but here are a few popular pediatrics groups in [place]:    [facility] [place] [phone]   [facility] [place] [phone]  [facility] [place] [phone]  [facility] [place] [phone]  [facility] [place] [phone]  [facility] [phone]    There are also many wonderful independent practitioners as well. We recommend you speak with family, friends, colleagues as personal recommendations can be very helpful.

## 2024-06-21 NOTE — PROGRESS NOTES
ADILSON    Chaperone declined     Partner here    +FM - baby is calmer after meals. Tightenings mild & intermittent. Lots of pelvic pressure. No leaking fluid. No vaginal bleeding. Even prior to pregnancy does see occasionally some sparkles in vision. Does have migraines with sparkle vision changes.     Vitals:    24 1249   BP: 104/82   Pulse: 74   Weight: 176 lb (79.8 kg)   Height: 67\"      33 year old  at 36w0d   KIMMIE 24 by LMP c/w 9w5d US  O+    - FOB: Dustin  - Baby boy!  - NIPS and carrier screen WNL  -Normal anatomy ultrasound. Isolated echogenic intracardiac focus - in context of normal NIPT testing, this is not clinically significant.   -Tdap done   -1 hr GTT, CBC, 3rd trim HIV & syphilis done.   -classes, pre-registration, pediatrician, breast pump, car seat discussed  -GBS collected. Cervix closed, thick. Fetal head very low. Mod consistency     IOL at 39-40 wk discussed. Will consider. Will see how US looks.      Small for dates, 2024   -NST reactive. Weekly NST (can skip if growth US reassuring)   -growth US ordered     Excessive weight gain  -wt gain goal 25-35 lb. Up 40 lb  -Cautioned regarding increased risk of fetal macrosomia, birth injury for fetus and mother, need for  section.    Low back pain, sciatica  -PT declined    Iron deficiency anemia  -4/20 hgb 10.8, ferritin 13 - advised supplemental PO Fe, she is taking  5/20 Hb 12.4     Urticaria   - intermittent on bilateral hips - possible heat rash?  Not seen today in clinic but seen on photos and raised hives seen localized.  - hydroxyzine sent to pharmacy     Patient had normal pap smear in [place]  - repeat after delivery    RTC 1 wk with NST (can skip NST if growth US is reassuring)

## 2024-06-23 LAB — GROUP B STREP BY PCR FOR PCR OVT: NEGATIVE

## 2024-06-24 ENCOUNTER — ULTRASOUND ENCOUNTER (OUTPATIENT)
Facility: CLINIC | Age: 34
End: 2024-06-24

## 2024-06-28 ENCOUNTER — TELEPHONE (OUTPATIENT)
Facility: CLINIC | Age: 34
End: 2024-06-28

## 2024-06-28 ENCOUNTER — ROUTINE PRENATAL (OUTPATIENT)
Facility: CLINIC | Age: 34
End: 2024-06-28

## 2024-06-28 VITALS
WEIGHT: 177 LBS | HEIGHT: 67 IN | HEART RATE: 72 BPM | DIASTOLIC BLOOD PRESSURE: 70 MMHG | BODY MASS INDEX: 27.78 KG/M2 | SYSTOLIC BLOOD PRESSURE: 102 MMHG

## 2024-06-28 DIAGNOSIS — Z34.03 PREGNANCY, FIRST, THIRD TRIMESTER (HCC): ICD-10-CM

## 2024-06-28 DIAGNOSIS — O99.013 ANEMIA AFFECTING PREGNANCY IN THIRD TRIMESTER (HCC): ICD-10-CM

## 2024-06-28 DIAGNOSIS — O26.03 EXCESSIVE WEIGHT GAIN DURING PREGNANCY IN THIRD TRIMESTER (HCC): Primary | ICD-10-CM

## 2024-06-28 DIAGNOSIS — O36.5930 POOR FETAL GROWTH AFFECTING MANAGEMENT OF MOTHER IN THIRD TRIMESTER, SINGLE OR UNSPECIFIED FETUS (HCC): ICD-10-CM

## 2024-06-28 DIAGNOSIS — E61.1 IRON DEFICIENCY: ICD-10-CM

## 2024-06-28 PROBLEM — J06.9 UPPER RESPIRATORY TRACT INFECTION: Status: RESOLVED | Noted: 2024-04-12 | Resolved: 2024-06-28

## 2024-06-28 NOTE — TELEPHONE ENCOUNTER
----- Message from Dionne VANEGAS sent at 6/28/2024 12:46 PM CDT -----  Regarding: FW: Please schedule PM Cytotec IOL for KIMMIE (40 wk) or a day or two before. Would be for dates/postdates.    ----- Message -----  From: Fatmata Gonzalez MD  Sent: 6/28/2024  12:43 PM CDT  To: [facility]2/[facility]  Subject: Please schedule PM Cytotec IOL for KIMMIE (40 w#

## 2024-06-28 NOTE — PROGRESS NOTES
ADILSON    Chaperone declined     Partner here     +FM - fetus more active this week. Sharp pulling pains right side. Tightenings mild & intermittent. Lots of pelvic pressure. No leaking fluid. No vaginal bleeding. Even prior to pregnancy does see occasionally some sparkles in vision. Does have migraines with aura - sparkle vision changes. A little HA this morning. Didn't sleep well. Fetus was moving a lot.     Vitals:    24 1201   BP: 102/70   Pulse: 72   Weight: 177 lb (80.3 kg)   Height: 67\"      *Of note, patient was placed on fetal monitor for NST by MA though patient did not strictly need an NST today as fetal movement improved and fetal growth US was reassuring. NST reactive today. NST tracing was not submitted to medical records today because there was not an indication for NST today.     33 year old  at 37w0d   KIMMIE 24 by LMP c/w 9w5d US  O+/GBS neg     - FOB: Dustin  - Baby boy!  - NIPS and carrier screen WNL  -Normal anatomy ultrasound. Isolated echogenic intracardiac focus - in context of normal NIPT testing, this is not clinically significant.   -Tdap done   -1 hr GTT, CBC, 3rd trim HIV & syphilis done.   -classes, pre-registration, pediatrician, breast pump, car seat discussed  -circumcision - still considering.   -Cervix - declines today.     IOL at 39-40 wk discussed. Would like around KIMMIE. Message  sent.      Small for dates by fundal height 2024   -24 NST reactive.   -24 - EFW 37%, AC 74%, TAVARES 18 cm - normal fetal growth & fluid.      Excessive weight gain  -wt gain goal 25-35 lb. Up 41 lb  -Cautioned regarding increased risk of fetal macrosomia, birth injury for fetus and mother, need for  section.    Low back pain, sciatica  -PT declined    Iron deficiency anemia  - hgb 10.8, ferritin 13 - advised supplemental PO Fe, she is taking  20 Hb 12.4     Urticaria   - intermittent on bilateral hips - possible heat rash?  Not seen today in clinic but seen on  photos and raised hives seen localized.  - hydroxyzine sent to pharmacy     Patient had normal pap smear in [place] 9/23 - repeat after delivery    RTC 1 wk

## 2024-07-05 ENCOUNTER — ROUTINE PRENATAL (OUTPATIENT)
Dept: OBGYN CLINIC | Facility: CLINIC | Age: 34
End: 2024-07-05
Payer: COMMERCIAL

## 2024-07-05 VITALS
SYSTOLIC BLOOD PRESSURE: 114 MMHG | BODY MASS INDEX: 28 KG/M2 | DIASTOLIC BLOOD PRESSURE: 74 MMHG | WEIGHT: 177.38 LBS | HEART RATE: 69 BPM

## 2024-07-05 DIAGNOSIS — Z3A.38 38 WEEKS GESTATION OF PREGNANCY (HCC): Primary | ICD-10-CM

## 2024-07-05 NOTE — PROGRESS NOTES
ADILSON 38w0d - visit # 11    She is having left sided groin pain - pt reassurred  -is interested in cord blood banking - info given  -has peds chosen  -Sve today FT/thick/-2 cephalic     KIMMIE 7/19/24 by LMP c/w 9w5d US  O+/GBS neg      - FOB: Dustin  - Baby boy!  - NIPS and carrier screen WNL  -Normal anatomy ultrasound. Isolated echogenic intracardiac focus - in context of normal NIPT testing, this is not clinically significant.   -Tdap done   -1 hr GTT, CBC, 3rd trim HIV & syphilis done.   -classes, pre-registration, pediatrician, breast pump, car seat discussed  -circumcision - still considering.   -  -IOL at 39-40 wk discussed. Would like around KIMMIE. -scheduled for 7/18 Cytotec and 7/19 pitocin      Small for dates by fundal height 6/21/2024   -6/21/24 NST reactive.   -6/24/24 - EFW 37%, AC 74%, TAVARES 18 cm - normal fetal growth & fluid.          Low back pain, sciatica  -PT declined     Iron deficiency anemia  -4/20 hgb 10.8, ferritin 13 - advised supplemental PO Fe, she is taking  5/20 Hb 12.4        Patient had normal pap smear in [place] 9/23 - repeat after delivery

## 2024-07-12 ENCOUNTER — ROUTINE PRENATAL (OUTPATIENT)
Facility: CLINIC | Age: 34
End: 2024-07-12
Payer: COMMERCIAL

## 2024-07-12 VITALS
BODY MASS INDEX: 28.56 KG/M2 | HEART RATE: 75 BPM | HEIGHT: 67 IN | SYSTOLIC BLOOD PRESSURE: 100 MMHG | WEIGHT: 182 LBS | DIASTOLIC BLOOD PRESSURE: 64 MMHG

## 2024-07-12 DIAGNOSIS — Z34.90 PRENATAL CARE, ANTEPARTUM (HCC): Primary | ICD-10-CM

## 2024-07-12 NOTE — PROGRESS NOTES
ADILSON 38w0d - visit # 12    Pt feeling well, more pressure/discomfort  Baby very active  SVE - still feels closed, vertex    KIMMIE 7/19/24 by LMP c/w 9w5d US  O+/GBS neg      - FOB: Dustin  - Baby boy!  - NIPS and carrier screen WNL  -Normal anatomy ultrasound. Isolated echogenic intracardiac focus - in context of normal NIPT testing, this is not clinically significant.   -Tdap done   -1 hr GTT, CBC, 3rd trim HIV & syphilis done.   -classes, pre-registration, pediatrician, breast pump, car seat discussed  -circumcision - still considering.   -is interested in cord blood banking - info given  -has peds chosen  -IOL at 39-40 wk discussed. Would like around KIMMIE. -scheduled for 7/18 Cytotec and 7/19 pitocin      Small for dates by fundal height 6/21/2024   -6/21/24 NST reactive.   -6/24/24 - EFW 37%, AC 74%, TAVARES 18 cm - normal fetal growth & fluid. Vertex          Low back pain, sciatica  -PT declined     Iron deficiency anemia  -4/20 hgb 10.8, ferritin 13 - advised supplemental PO Fe, she is taking  5/20 Hb 12.4        Patient had normal pap smear in [place] 9/23 - repeat after delivery

## 2024-07-13 ENCOUNTER — APPOINTMENT (OUTPATIENT)
Dept: GENERAL RADIOLOGY | Facility: HOSPITAL | Age: 34
End: 2024-07-13
Attending: OBSTETRICS & GYNECOLOGY
Payer: COMMERCIAL

## 2024-07-13 ENCOUNTER — APPOINTMENT (OUTPATIENT)
Dept: ULTRASOUND IMAGING | Facility: HOSPITAL | Age: 34
End: 2024-07-13
Attending: OBSTETRICS & GYNECOLOGY
Payer: COMMERCIAL

## 2024-07-13 ENCOUNTER — HOSPITAL ENCOUNTER (INPATIENT)
Facility: HOSPITAL | Age: 34
LOS: 3 days | Discharge: HOME | End: 2024-07-17
Attending: OBSTETRICS & GYNECOLOGY | Admitting: OBSTETRICS & GYNECOLOGY
Payer: COMMERCIAL

## 2024-07-13 PROBLEM — Z34.90 PREGNANCY (HCC): Status: ACTIVE | Noted: 2024-07-13

## 2024-07-13 LAB
BASOPHILS # BLD AUTO: 0.03 X10(3) UL (ref 0–0.2)
BASOPHILS NFR BLD AUTO: 0.3 %
EOSINOPHIL # BLD AUTO: 0.04 X10(3) UL (ref 0–0.7)
EOSINOPHIL NFR BLD AUTO: 0.4 %
ERYTHROCYTE [DISTWIDTH] IN BLOOD BY AUTOMATED COUNT: 13.2 %
HCT VFR BLD AUTO: 34 %
HGB BLD-MCNC: 11.9 G/DL
IMM GRANULOCYTES # BLD AUTO: 0.3 X10(3) UL (ref 0–1)
IMM GRANULOCYTES NFR BLD: 3.3 %
LYMPHOCYTES # BLD AUTO: 1.74 X10(3) UL (ref 1–4)
LYMPHOCYTES NFR BLD AUTO: 19.2 %
MCH RBC QN AUTO: 31.1 PG (ref 26–34)
MCHC RBC AUTO-ENTMCNC: 35 G/DL (ref 31–37)
MCV RBC AUTO: 88.8 FL
MONOCYTES # BLD AUTO: 0.73 X10(3) UL (ref 0.1–1)
MONOCYTES NFR BLD AUTO: 8 %
NEUTROPHILS # BLD AUTO: 6.23 X10 (3) UL (ref 1.5–7.7)
NEUTROPHILS # BLD AUTO: 6.23 X10(3) UL (ref 1.5–7.7)
NEUTROPHILS NFR BLD AUTO: 68.8 %
PLATELET # BLD AUTO: 154 10(3)UL (ref 150–450)
RBC # BLD AUTO: 3.83 X10(6)UL
WBC # BLD AUTO: 9.1 X10(3) UL (ref 4–11)

## 2024-07-13 PROCEDURE — 73600 X-RAY EXAM OF ANKLE: CPT | Performed by: OBSTETRICS & GYNECOLOGY

## 2024-07-13 PROCEDURE — 73560 X-RAY EXAM OF KNEE 1 OR 2: CPT | Performed by: OBSTETRICS & GYNECOLOGY

## 2024-07-13 PROCEDURE — 3E0DXGC INTRODUCTION OF OTHER THERAPEUTIC SUBSTANCE INTO MOUTH AND PHARYNX, EXTERNAL APPROACH: ICD-10-PCS | Performed by: STUDENT IN AN ORGANIZED HEALTH CARE EDUCATION/TRAINING PROGRAM

## 2024-07-13 RX ORDER — CALCIUM CARBONATE 500 MG/1
1000 TABLET, CHEWABLE ORAL AS NEEDED
Status: DISCONTINUED | OUTPATIENT
Start: 2024-07-13 | End: 2024-07-17

## 2024-07-13 RX ORDER — ACETAMINOPHEN 500 MG
1000 TABLET ORAL EVERY 6 HOURS PRN
Status: DISCONTINUED | OUTPATIENT
Start: 2024-07-13 | End: 2024-07-15

## 2024-07-14 ENCOUNTER — APPOINTMENT (OUTPATIENT)
Dept: ULTRASOUND IMAGING | Facility: HOSPITAL | Age: 34
End: 2024-07-14
Attending: OBSTETRICS & GYNECOLOGY
Payer: COMMERCIAL

## 2024-07-14 LAB
ANTIBODY SCREEN: NEGATIVE
APTT PPP: 27.3 SECONDS (ref 23–36)
FIBRINOGEN PPP-MCNC: 438 MG/DL (ref 200–480)
INR BLD: 0.96 (ref 0.8–1.2)
KLEIHAUER BETKE RESULT: NEGATIVE
PROTHROMBIN TIME: 12.8 SECONDS (ref 11.6–14.8)
RH BLOOD TYPE: POSITIVE
T PALLIDUM AB SER QL IA: NONREACTIVE

## 2024-07-14 PROCEDURE — 76815 OB US LIMITED FETUS(S): CPT | Performed by: OBSTETRICS & GYNECOLOGY

## 2024-07-14 PROCEDURE — 3E033VJ INTRODUCTION OF OTHER HORMONE INTO PERIPHERAL VEIN, PERCUTANEOUS APPROACH: ICD-10-PCS | Performed by: STUDENT IN AN ORGANIZED HEALTH CARE EDUCATION/TRAINING PROGRAM

## 2024-07-14 RX ORDER — HYDROMORPHONE HYDROCHLORIDE 1 MG/ML
1 INJECTION, SOLUTION INTRAMUSCULAR; INTRAVENOUS; SUBCUTANEOUS EVERY 2 HOUR PRN
Status: DISCONTINUED | OUTPATIENT
Start: 2024-07-14 | End: 2024-07-17

## 2024-07-14 RX ORDER — SODIUM CHLORIDE, SODIUM LACTATE, POTASSIUM CHLORIDE, CALCIUM CHLORIDE 600; 310; 30; 20 MG/100ML; MG/100ML; MG/100ML; MG/100ML
INJECTION, SOLUTION INTRAVENOUS CONTINUOUS
Status: DISCONTINUED | OUTPATIENT
Start: 2024-07-14 | End: 2024-07-15 | Stop reason: HOSPADM

## 2024-07-14 RX ORDER — CITRIC ACID/SODIUM CITRATE 334-500MG
30 SOLUTION, ORAL ORAL AS NEEDED
Status: DISCONTINUED | OUTPATIENT
Start: 2024-07-14 | End: 2024-07-15 | Stop reason: HOSPADM

## 2024-07-14 RX ORDER — IBUPROFEN 600 MG/1
600 TABLET ORAL ONCE AS NEEDED
Status: COMPLETED | OUTPATIENT
Start: 2024-07-14 | End: 2024-07-15

## 2024-07-14 RX ORDER — ONDANSETRON 2 MG/ML
4 INJECTION INTRAMUSCULAR; INTRAVENOUS EVERY 6 HOURS PRN
Status: DISCONTINUED | OUTPATIENT
Start: 2024-07-14 | End: 2024-07-15 | Stop reason: HOSPADM

## 2024-07-14 RX ORDER — ACETAMINOPHEN 500 MG
1000 TABLET ORAL EVERY 6 HOURS PRN
Status: DISCONTINUED | OUTPATIENT
Start: 2024-07-14 | End: 2024-07-15

## 2024-07-14 RX ORDER — DEXTROSE, SODIUM CHLORIDE, SODIUM LACTATE, POTASSIUM CHLORIDE, AND CALCIUM CHLORIDE 5; .6; .31; .03; .02 G/100ML; G/100ML; G/100ML; G/100ML; G/100ML
INJECTION, SOLUTION INTRAVENOUS AS NEEDED
Status: DISCONTINUED | OUTPATIENT
Start: 2024-07-14 | End: 2024-07-15 | Stop reason: HOSPADM

## 2024-07-14 RX ORDER — DIAPER,BRIEF,INFANT-TODD,DISP
EACH MISCELLANEOUS 2 TIMES DAILY
Status: DISCONTINUED | OUTPATIENT
Start: 2024-07-14 | End: 2024-07-17

## 2024-07-14 RX ORDER — ACETAMINOPHEN 500 MG
500 TABLET ORAL EVERY 6 HOURS PRN
Status: DISCONTINUED | OUTPATIENT
Start: 2024-07-14 | End: 2024-07-15

## 2024-07-14 RX ORDER — DIAPER,BRIEF,INFANT-TODD,DISP
EACH MISCELLANEOUS 2 TIMES DAILY
Status: DISCONTINUED | OUTPATIENT
Start: 2024-07-14 | End: 2024-07-14

## 2024-07-14 RX ORDER — TERBUTALINE SULFATE 1 MG/ML
0.25 INJECTION, SOLUTION SUBCUTANEOUS AS NEEDED
Status: DISCONTINUED | OUTPATIENT
Start: 2024-07-14 | End: 2024-07-15 | Stop reason: HOSPADM

## 2024-07-14 NOTE — CONSULTS
[facility]   part of [facility]    Report of Consultation    Shelby Andujar Patient Status:  Inpatient    1990 MRN [id number]   Location [facility] LABOR & DELIVERY Attending Fatmata Gonzalez MD   Hosp Day # 0 PCP None Pcp     Date of Admission:  2024  Date of Consult:  2024    Reason for Consultation:  Bilateral knee and left ankle pain following fall    History of Present Illness:  Shelby Andujar is a a(n) 33 year old female who presents for evaluation of bilateral knee and left ankle pain following a fall.  Patient fell the tear aspect of the knees bilaterally as well as had an inversion type mechanism noted to the left ankle.  She has had pain predominantly on the anterior and lateral aspect of the ankle and tenderness to touch over the lateral ligamentous complex.  She has been able to tolerate weightbearing on the extremity to short distances.  Denies any numbness, tingling, weakness distally.  Able to range the knees without pain.    History:  Past Medical History:    ASCUS with positive high risk HPV cervical    per [facility]. [facility]    Iron deficiency anemia during pregnancy (HCC)    Migraine with aura    sparkles in vision.    Pap smear for cervical cancer screening    Patient reports normal pap smear in [place]     Past Surgical History:   Procedure Laterality Date    Colposcopy,bx cervix/endocerv curr  02/10/2022    cervical tissue with squamous metaplasia, unremarkable endocervical tissue. Shilpa Walker - [facility]     Family History   Problem Relation Age of Onset    Gastrointestinal Cancer Father     Cancer Father       reports that she has never smoked. She has never used smokeless tobacco. She reports that she does not currently use alcohol. She reports that she does not use drugs.    Allergies:  No Known Allergies    Medications:    Current Facility-Administered Medications:     lactated ringers infusion, , Intravenous, Continuous    dextrose in lactated ringers 5%  infusion, , Intravenous, PRN    lactated ringers IV bolus 500 mL, 500 mL, Intravenous, PRN    acetaminophen (Tylenol Extra Strength) tab 500 mg, 500 mg, Oral, Q6H PRN    acetaminophen (Tylenol Extra Strength) tab 1,000 mg, 1,000 mg, Oral, Q6H PRN    ibuprofen (Motrin) tab 600 mg, 600 mg, Oral, Once PRN    ondansetron (Zofran) 4 MG/2ML injection 4 mg, 4 mg, Intravenous, Q6H PRN    oxyTOCIN in sodium chloride 0.9% (Pitocin) 30 Units/500mL infusion premix, 62.5-900 jayson-units/min, Intravenous, Continuous    terbutaline (Brethine) 1 MG/ML injection 0.25 mg, 0.25 mg, Subcutaneous, PRN    sodium citrate-citric acid (Bicitra) 500-334 MG/5ML oral solution 30 mL, 30 mL, Oral, PRN    misoprostol (CYTOTEC) partial tablets 25 mcg, 25 mcg, Vaginal, Q4H    calcium carbonate (Tums) chewable tab 1,000 mg, 1,000 mg, Oral, PRN    acetaminophen (Tylenol Extra Strength) tab 1,000 mg, 1,000 mg, Oral, Q6H PRN    Physical Exam:    General: Alert, orientated x3.  Cooperative.  No apparent distress.  Vital Signs:  Blood pressure 123/72, pulse 71, temperature 97.6 °F (36.4 °C), temperature source Oral, resp. rate 16, height 5' 7\" (1.702 m), weight 182 lb (82.6 kg), last menstrual period 10/13/2023.  BLE:  No gross deformity.  Present to the knees bilaterally to the anterior aspect.  No overlying erythema.  No significant effusion.  Left ankle with moderate swelling laterally.  Tenderness to palpation over the anterior lateral aspect of the ankle overlying the lateral ligamentous complex.  Appropriate range of motion of the knee and left ankle.  No tenderness overlying the medial or lateral malleoli of the left ankle.  Neurovascular intact distally    Radiographic Data:  2 view x-ray of bilateral knees including AP and lateral demonstrate no acute fracture or osseous abnormality.  There is well-maintained joint space tricompartmentally without evidence of degenerative change    Views of the left ankle including AP, oblique, lateral  demonstrate no acute fracture or osseous abnormality.  There is well-maintained joint space without significant medial clear space widening or syndesmotic widening.    Impression and Plan:  Patient Active Problem List   Diagnosis    Low back pain during pregnancy in second trimester ([facility])    Pregnancy, first, third trimester ([facility])    Disorder of musculoskeletal system during pregnancy ([facility])    Anemia affecting pregnancy in third trimester ([facility])    Iron deficiency    Excessive weight gain during pregnancy in third trimester ([facility])    Migraine with aura    Small for dates by fundal height with reassuring growth US 6/24/24    Pregnancy ([facility])       33-year-old female who presents following a fall with a scheduled induction with pain to bilateral knees and left ankle.    -Reviewed imaging with patient thankfully no obvious fracture visualized  -Symptoms most consistent with bilateral anterior knee contusion and left ankle sprain  -Will plan for nonsurgical management for these issues  -For left ankle and bilateral knees cleared for oral positioning and use as needed for delivery  -Recommend rest, ice, elevation, and compression as able for left ankle sprain  -Given severity of ankle symptoms would be very appropriate to obtain a cam walking boot for patient in postpartum to provide support to the ankle.  Patient can be weightbearing as tolerated in the walking boot.  This can be obtained from  orthotics.  -Discussed with patient she should use this for 2 to 3 weeks and then discontinue with transition to normal supportive shoe wear as she feels able based off symptom  -Continue Tylenol on an as-needed basis for pain    Payam Leahy MD  7/14/2024  12:20 PM

## 2024-07-14 NOTE — H&P
[facility]  Obstetrics and Gynecology  History & Physical    Shelby Andujar Patient Status:  Observation    1990 MRN [id number]   Location [facility] LABOR & DELIVERY Attending Fatmata Gonzalez MD   Hospital Day 0 PCP None Pcp     CC: Patient is here for IOL 2/2 elective    SUBJECTIVE:    Shelby Andujar is a 33 year old  female at 39w2d Estimated Date of Delivery: 24 who is being admitted for IOL 2/2 elective. Her current obstetrical history is significant for anemia. Patient reports  she went for a walk at 2230 . She had fallen forward due to tripping on unleveled surface. She reports she caught the majority of herself with her hands and knees. She reports increased pain on left ankle just after fall. She denies LOC but reports event happen quickly. She denies abdominal wall contact but states she is not completely certain. Denies Ucx, LOF or VB. +FM. Patient scheduled for IOL on Thursday,       negative Nausea, Vomiting, headache, vision changes and RUQ/Epigastric pain.         KIMMIE Confirmation  LMP: Patient's last menstrual period was 10/13/2023 (exact date).  KIMMIE: 2024, by Last Menstrual Period       Obstetric History:   OB History    Para Term  AB Living   1             SAB IAB Ectopic Multiple Live Births                  # Outcome Date GA Lbr Geronimo/2nd Weight Sex Type Anes PTL Lv   1 Current              Past Medical History:   Past Medical History:    ASCUS with positive high risk HPV cervical    per [facility]. [facility]    Iron deficiency anemia during pregnancy (HCC)    Migraine with aura    sparkles in vision.    Pap smear for cervical cancer screening    Patient reports normal pap smear in [place]     Past Social History:   Past Surgical History:   Procedure Laterality Date    Colposcopy,bx cervix/endocerv curr  02/10/2022    cervical tissue with squamous metaplasia, unremarkable endocervical tissue. Shilpa Walker - [facility]     Family History:   Family  History   Problem Relation Age of Onset    Gastrointestinal Cancer Father     Cancer Father      Social History:   Social History     Tobacco Use    Smoking status: Never    Smokeless tobacco: Never   Substance Use Topics    Alcohol use: Not Currently     Comment: socially       Home Meds:   Medications Prior to Admission   Medication Sig Dispense Refill Last Dose    hydrOXYzine 25 MG Oral Tab Take 1 tablet (25 mg total) by mouth 3 (three) times daily as needed for Itching. 30 tablet 0 Past Month    Ferrous Sulfate (IRON) 325 (65 Fe) MG Oral Tab Take by mouth.   2024    Prenatal MV-Min-Fe Fum-FA-DHA (PRENATAL 1 OR) Take by mouth.   2024     Allergies: No Known Allergies    OBJECTIVE:    Temp:  [97.9 °F (36.6 °C)] 97.9 °F (36.6 °C)  Pulse:  [70] 70  Resp:  [18] 18  BP: (114)/(63) 114/63  Body mass index is 28.51 kg/m².    General: AAO. NAD  Lungs: no tachypnea, retractions or cyanosis  CV: not examined  Abdomen: FHT present, gravid   Extremities: positive edema left ankle and bilateral knees with abrasions on bilateral knees. Tender to palpation over left ankle. negative calf tenderness bilaterally, Filippo's sign negative bilaterally     FHT: moderate variability/120 BPM / Positive accelerations/Negative decelerations   TOCO: irregular    SVE: closed on SVE 24, repeat per RN prior to IOL initiation     [place]:  cephalic, EFW 7 lbs 0 oz    Prenatal Labs Brief Review   Blood Type:   Lab Results   Component Value Date    ABO O 2023    RH Positive 2023     GBS:  Negative      Inpatient labs:  Lab Results   Component Value Date    WBC 9.1 2024    HGB 11.9 2024    HCT 34.0 2024    .0 2024    PTT 27.3 2024    INR 0.96 2024       ASSESSMENT/ PLAN:    Shelby Andujar is a 33 year old  female at 39w2d Estimated Date of Delivery: 24 who is being admitted for IOL 2/2 elective.    Patient Active Problem List    Diagnosis    Pregnancy (HCC)    Small for  dates by fundal height with reassuring growth US 24    Excessive weight gain during pregnancy in third trimester ([facility])    Migraine with aura     sparkles in vision.      Iron deficiency    Anemia affecting pregnancy in third trimester ([facility])    Low back pain during pregnancy in second trimester ([facility])    Pregnancy, first, third trimester ([facility])    Disorder of musculoskeletal system during pregnancy ([facility])       1. Labor:   - Cervical ripening: Cytotec 25 mcg per vagina every 4 hrs x 3-4 doses   - Plan to initiate Pitocin per protocol following cervical ripening  2. Fetal monitoring: CEFM  3. GBS: negative   4. Pain: May have IV pain medication for epidural per patient request  5. Fall   -d/w patient concern for fall in pregnancy results in lower extremity injury and subsequent irregular contraction at 39 weeks. D/w patient concern for subsequent fall risk in pregnancy due to current ankle injury. Recommend to move forward with IOL. Patient agree for IOL today.   - recommend Ortho consult for left ankle injury     Risks, benefits, alternatives and possible complications have been discussed in detail with the patient.  Pre-admission, admission, and post admission procedures and expectations were discussed in detail.  All questions answered, all appropriate consents will be signed at the Hospital. Admission is planned for today.  anticipated.    Rosa Huff MD   [facility] - OBGYN          Note to patient and family   The 21st Century Cures Act makes medical notes available to patients in the interest of transparency.  However, please be advised that this is a medical document.  It is intended as [id number] communication.  It is written and medical language may contain abbreviations or verbiage that are technical and unfamiliar.  It may appear blunt or direct.  Medical documents are intended to carry relevant information, facts as evident, and the clinical opinion of the practitioner.

## 2024-07-14 NOTE — PLAN OF CARE
Problem: SAFETY ADULT - FALL  Goal: Free from fall injury  Description: INTERVENTIONS:  - Assess pt frequently for physical needs  - Identify cognitive and physical deficits and behaviors that affect risk of falls.  - [place] fall precautions as indicated by assessment.  - Educate pt/family on patient safety including physical limitations  - Instruct pt to call for assistance with activity based on assessment  - Modify environment to reduce risk of injury  - Provide assistive devices as appropriate  - Consider OT/PT consult to assist with strengthening/mobility  - Encourage toileting schedule  Outcome: Progressing     Problem: ANTEPARTUM/LABOR and DELIVERY  Goal: Maintain pregnancy as long as maternal and/or fetal condition is stable  Description: INTERVENTIONS:  - Maternal surveillance  - Fetal surveillance  - Monitor uterine activity  - Medications as ordered  - Bedrest  Outcome: Progressing  Goal: Anxiety is at manageable level  Description: INTERVENTIONS:  - [place] patient to unit/surroundings  - Establish a trusting relationship with patient  - Discuss possible complications or alterations in birth plan  - Explain treatment plan  - Explain testing/procedures prior to initiation  - Encourage participation in care  - Encourage verbalization of concerns/fears  - Identify coping mechanisms  - Administer/offer alternative therapies (Aroma therapy, distraction, guided imagery, massage, music therapy, therapeutic touch)  - Manage patient's environment (Avoid overstimulation of patient)  Outcome: Progressing  Goal: Demonstrates ability to cope with hospitalization/illness  Description: INTERVENTIONS:  - Encourage verbalization of feelings/concerns/expectations  - Provide quiet environment  - Assist patient to identify own strengths and abilities  - Encourage patient to set small goals for self  - Encourage participation in diversional activity  - Reinforce positive adaptation of new coping behaviors  - Include  patient/family/caregiver in decisions  Outcome: Progressing     Problem: Patient/Family Goals  Goal: Patient/Family Long Term Goal  Description: Patient's Long Term Goal: adequate pain management    Interventions:  - follow prescribed POC  - See additional Care Plan goals for specific interventions  Outcome: Progressing  Goal: Patient/Family Short Term Goal  Description: Patient's Short Term Goal: maintain healthy preg    Interventions:   - follow prescribed POC  - See additional Care Plan goals for specific interventions  Outcome: Progressing

## 2024-07-14 NOTE — PLAN OF CARE
Problem: SAFETY ADULT - FALL  Goal: Free from fall injury  Description: INTERVENTIONS:  - Assess pt frequently for physical needs  - Identify cognitive and physical deficits and behaviors that affect risk of falls.  - [place] fall precautions as indicated by assessment.  - Educate pt/family on patient safety including physical limitations  - Instruct pt to call for assistance with activity based on assessment  - Modify environment to reduce risk of injury  - Provide assistive devices as appropriate  - Consider OT/PT consult to assist with strengthening/mobility  - Encourage toileting schedule  Outcome: Progressing     Problem: ANTEPARTUM/LABOR and DELIVERY  Goal: Maintain pregnancy as long as maternal and/or fetal condition is stable  Description: INTERVENTIONS:  - Maternal surveillance  - Fetal surveillance  - Monitor uterine activity  - Medications as ordered  - Bedrest  Outcome: Progressing  Goal: Anxiety is at manageable level  Description: INTERVENTIONS:  - [place] patient to unit/surroundings  - Establish a trusting relationship with patient  - Discuss possible complications or alterations in birth plan  - Explain treatment plan  - Explain testing/procedures prior to initiation  - Encourage participation in care  - Encourage verbalization of concerns/fears  - Identify coping mechanisms  - Administer/offer alternative therapies (Aroma therapy, distraction, guided imagery, massage, music therapy, therapeutic touch)  - Manage patient's environment (Avoid overstimulation of patient)  Outcome: Progressing  Goal: Demonstrates ability to cope with hospitalization/illness  Description: INTERVENTIONS:  - Encourage verbalization of feelings/concerns/expectations  - Provide quiet environment  - Assist patient to identify own strengths and abilities  - Encourage patient to set small goals for self  - Encourage participation in diversional activity  - Reinforce positive adaptation of new coping behaviors  - Include  patient/family/caregiver in decisions  Outcome: Progressing     Problem: Patient/Family Goals  Goal: Patient/Family Long Term Goal  Description: Patient's Long Term Goal: adequate pain management    Interventions:  - follow prescribed POC  - See additional Care Plan goals for specific interventions  Outcome: Progressing  Goal: Patient/Family Short Term Goal  Description: Patient's Short Term Goal: maintain healthy preg    Interventions:   - follow prescribed POC  - See additional Care Plan goals for specific interventions  Outcome: Progressing

## 2024-07-14 NOTE — PROGRESS NOTES
Pt  EDC 23 presents to L&D with c/o falling at 2230. Pt states her and her  were out for a walk and she tripped and fell. Pt states she fell on the hands and knees but unsure if she hit her abd as she thinks she lost consciousness  Briefly. Pt denies hitting her head. Pt denies LOF, vag bleeding, states she has felt tightening of abd a couple time since fall. Pt states has not felt baby move since falling. EFM applied.

## 2024-07-14 NOTE — PROGRESS NOTES
Continuing to follow Ortho MD orders elevating sprained foot, icing Q 15 on and 15 off with compression and rest. Pt states foot is feeling better and appears to be less swollen at this time. Will continue plan of care.

## 2024-07-14 NOTE — PROGRESS NOTES
Pt noted to have abrasions to both knees, greater on right. Swelling to both knees, greater in right. Left ankle swollen.

## 2024-07-15 ENCOUNTER — ANESTHESIA (OUTPATIENT)
Dept: OBGYN UNIT | Facility: HOSPITAL | Age: 34
End: 2024-07-15
Payer: COMMERCIAL

## 2024-07-15 ENCOUNTER — TELEPHONE (OUTPATIENT)
Facility: CLINIC | Age: 34
End: 2024-07-15

## 2024-07-15 ENCOUNTER — ANESTHESIA EVENT (OUTPATIENT)
Dept: OBGYN UNIT | Facility: HOSPITAL | Age: 34
End: 2024-07-15
Payer: COMMERCIAL

## 2024-07-15 PROBLEM — Z37.9 VACUUM-ASSISTED VAGINAL DELIVERY (HCC): Status: ACTIVE | Noted: 2024-07-15

## 2024-07-15 LAB
BASOPHILS # BLD AUTO: 0.03 X10(3) UL (ref 0–0.2)
BASOPHILS NFR BLD AUTO: 0.2 %
EOSINOPHIL # BLD AUTO: 0.03 X10(3) UL (ref 0–0.7)
EOSINOPHIL NFR BLD AUTO: 0.2 %
ERYTHROCYTE [DISTWIDTH] IN BLOOD BY AUTOMATED COUNT: 13.3 %
HCT VFR BLD AUTO: 37.9 %
HGB BLD-MCNC: 13.1 G/DL
IMM GRANULOCYTES # BLD AUTO: 0.17 X10(3) UL (ref 0–1)
IMM GRANULOCYTES NFR BLD: 1.4 %
LYMPHOCYTES # BLD AUTO: 1.37 X10(3) UL (ref 1–4)
LYMPHOCYTES NFR BLD AUTO: 11.1 %
MCH RBC QN AUTO: 31.3 PG (ref 26–34)
MCHC RBC AUTO-ENTMCNC: 34.6 G/DL (ref 31–37)
MCV RBC AUTO: 90.5 FL
MONOCYTES # BLD AUTO: 0.7 X10(3) UL (ref 0.1–1)
MONOCYTES NFR BLD AUTO: 5.7 %
NEUTROPHILS # BLD AUTO: 10.06 X10 (3) UL (ref 1.5–7.7)
NEUTROPHILS # BLD AUTO: 10.06 X10(3) UL (ref 1.5–7.7)
NEUTROPHILS NFR BLD AUTO: 81.4 %
PLATELET # BLD AUTO: 140 10(3)UL (ref 150–450)
RBC # BLD AUTO: 4.19 X10(6)UL
WBC # BLD AUTO: 12.4 X10(3) UL (ref 4–11)

## 2024-07-15 PROCEDURE — 0KQM0ZZ REPAIR PERINEUM MUSCLE, OPEN APPROACH: ICD-10-PCS | Performed by: STUDENT IN AN ORGANIZED HEALTH CARE EDUCATION/TRAINING PROGRAM

## 2024-07-15 PROCEDURE — 59410 OBSTETRICAL CARE: CPT | Performed by: STUDENT IN AN ORGANIZED HEALTH CARE EDUCATION/TRAINING PROGRAM

## 2024-07-15 RX ORDER — BUPIVACAINE HYDROCHLORIDE 2.5 MG/ML
INJECTION, SOLUTION EPIDURAL; INFILTRATION; INTRACAUDAL AS NEEDED
Status: DISCONTINUED | OUTPATIENT
Start: 2024-07-15 | End: 2024-07-15 | Stop reason: SURG

## 2024-07-15 RX ORDER — IBUPROFEN 600 MG/1
600 TABLET ORAL EVERY 6 HOURS
Status: DISCONTINUED | OUTPATIENT
Start: 2024-07-15 | End: 2024-07-17

## 2024-07-15 RX ORDER — ACETAMINOPHEN 500 MG
1000 TABLET ORAL EVERY 6 HOURS PRN
Status: DISCONTINUED | OUTPATIENT
Start: 2024-07-15 | End: 2024-07-17

## 2024-07-15 RX ORDER — TRANEXAMIC ACID 10 MG/ML
1000 INJECTION, SOLUTION INTRAVENOUS ONCE
Status: COMPLETED | OUTPATIENT
Start: 2024-07-15 | End: 2024-07-15

## 2024-07-15 RX ORDER — DOCUSATE SODIUM 100 MG/1
100 CAPSULE, LIQUID FILLED ORAL
Status: DISCONTINUED | OUTPATIENT
Start: 2024-07-15 | End: 2024-07-17

## 2024-07-15 RX ORDER — ACETAMINOPHEN 500 MG
500 TABLET ORAL EVERY 6 HOURS PRN
Status: DISCONTINUED | OUTPATIENT
Start: 2024-07-15 | End: 2024-07-17

## 2024-07-15 RX ORDER — TRANEXAMIC ACID 10 MG/ML
INJECTION, SOLUTION INTRAVENOUS
Status: COMPLETED
Start: 2024-07-15 | End: 2024-07-15

## 2024-07-15 RX ORDER — BUPIVACAINE HCL/0.9 % NACL/PF 0.25 %
5 PLASTIC BAG, INJECTION (ML) EPIDURAL AS NEEDED
Status: DISCONTINUED | OUTPATIENT
Start: 2024-07-15 | End: 2024-07-17

## 2024-07-15 RX ORDER — BISACODYL 10 MG
10 SUPPOSITORY, RECTAL RECTAL ONCE AS NEEDED
Status: DISCONTINUED | OUTPATIENT
Start: 2024-07-15 | End: 2024-07-17

## 2024-07-15 RX ORDER — MISOPROSTOL 200 UG/1
1000 TABLET ORAL ONCE
Status: COMPLETED | OUTPATIENT
Start: 2024-07-15 | End: 2024-07-15

## 2024-07-15 RX ORDER — SIMETHICONE 80 MG
80 TABLET,CHEWABLE ORAL 3 TIMES DAILY PRN
Status: DISCONTINUED | OUTPATIENT
Start: 2024-07-15 | End: 2024-07-17

## 2024-07-15 RX ORDER — NALBUPHINE HYDROCHLORIDE 10 MG/ML
2.5 INJECTION, SOLUTION INTRAMUSCULAR; INTRAVENOUS; SUBCUTANEOUS
Status: DISCONTINUED | OUTPATIENT
Start: 2024-07-15 | End: 2024-07-17

## 2024-07-15 RX ORDER — MISOPROSTOL 200 UG/1
TABLET ORAL
Status: COMPLETED
Start: 2024-07-15 | End: 2024-07-15

## 2024-07-15 RX ORDER — LIDOCAINE HYDROCHLORIDE AND EPINEPHRINE 15; 5 MG/ML; UG/ML
INJECTION, SOLUTION EPIDURAL AS NEEDED
Status: DISCONTINUED | OUTPATIENT
Start: 2024-07-15 | End: 2024-07-15 | Stop reason: SURG

## 2024-07-15 RX ADMIN — LIDOCAINE HYDROCHLORIDE AND EPINEPHRINE 3 ML: 15; 5 INJECTION, SOLUTION EPIDURAL at 09:00:00

## 2024-07-15 RX ADMIN — BUPIVACAINE HYDROCHLORIDE 0.5 ML: 2.5 INJECTION, SOLUTION EPIDURAL; INFILTRATION; INTRACAUDAL at 09:00:00

## 2024-07-15 NOTE — L&D DELIVERY NOTE
Dick Andujar [[id number]]      Labor Events     labor?: No   steroids?: None  Antibiotics received during labor?: No  Rupture date/time: 7/15/2024 0812     Rupture type: AROM  Fluid color: Clear, Bright red  Labor type: Induced Onset of Labor  Induction: Oxytocin, Misoprostol, AROM  Cervical ripening date/time: 2024  Indications for induction: Other - comment  Induction comment: IOL for risk of fall due to ankle injury   Intrapartum & labor complications: Abruptio Placenta, Variable decelerations       Labor Length    1st stage: 7h 53m  2nd stage: 0h 51m  3rd stage: 0h 00m       Labor Event Times    Labor onset date/time: 7/15/2024 0812  Dilation complete date/time: 7/15/2024 160  Start pushing date/time: 7/15/2024 160       [place] Presentation    Presentation: Vertex  Position: Left Occiput Anterior       Operative Delivery    Operative Vaginal Delivery: N/A                Shoulder Dystocia    Shoulder Dystocia: N/A       Anesthesia    Method: Epidural              [place] Delivery      Head delivery date/time: 7/15/2024 16:56:40   Delivery date/time:  7/15/24 16:56:45   Delivery type: Normal spontaneous vaginal delivery    Details:     Delivery location: delivery room       Delivery Providers    Delivering Clinician: Shilpa Jackson MD   Delivery personnel:  Provider Role   Claire Conway, JAYSHREE Baby Nurse   Ashley Willard RN Delivery Nurse   Sol Adan RN Registered Nurse             Cord    Vessels: 3 Vessels  Complications: None  Timed cord clamping: Yes  Time in sec: 30  Cord blood disposition: to lab  Gases sent?: No       Resuscitation    Method: None        Measurements      Weight: 3040 g 6 lb 11.2 oz Length: 52.1 cm     Head circum.: 34 cm Chest circum.: 30.5 cm      Abdominal circum.: 31.5 cm           Placenta    Date/time: 7/15/2024 1657  Removal: Spontaneous  Appearance: Intact  Disposition: Pathology       Apgars    Living status: Living   Apgar Scoring  Key:    0 1 2    Skin color Blue or pale Acrocyanotic Completely pink    Heart rate Absent <100 bpm >100 bpm    Reflex irritability No response Grimace Cry or active withdrawal    Muscle tone Limp Some flexion Active motion    Respiratory effort Absent Weak cry; hypoventilation Good, crying              1 Minute:  5 Minute:  10 Minute:  15 Minute:  20 Minute:      Skin color: 0  1       Heart rate: 2  2       Reflex irritablity: 2  2       Muscle tone: 2  2       Respiratory effort: 2  2       Total: 8  9          Apgars assigned by: NICOLA FRYE RN   disposition: with mother       Skin to Skin    Skin to skin initiated date/time: 7/15/2024 171  Skin to skin with: Mother       Vaginal Count    Initial count RN: Ashley Willard RN  Initial count Tech: Citlalli Lyle   Sponges   Sharps    Initial counts 11   0    Final counts 11   1    Final count RN: Ashley Willard RN  Final count MD: Shilpa Jackson MD       Lacerations    Episiotomy: None  Perineal lacerations: 2nd Repaired?: Yes     Vaginal laceration?: No      Cervical laceration?: No    Clitoral laceration?: No    Quantitative blood loss (mL): 411              Vaginal Delivery Note    Iinduction of labor was achieved with cytotec, pitocin, amniotomy with clear fluid.  Epiudural was used for pain management.  Patient progressed to completely dilated and started pushing at 1608.  With good maternal effort, patient delivered baby in occiput anterior position at 1656.  Nuchal x1.  IV pitocin bolus was given.  Within 30 minutes of delivery, placenta was delivered with gentle traction.  2nd degree laceration was repaired in the usual fashion with 3-0 chromic. Due to uterine atony, IN cytotec and IV TXA given. All instruments and sponges were counted.  Fundus was firm and bleeding minimal when provider left the room.

## 2024-07-15 NOTE — PLAN OF CARE
Problem: SAFETY ADULT - FALL  Goal: Free from fall injury  Description: INTERVENTIONS:  - Assess pt frequently for physical needs  - Identify cognitive and physical deficits and behaviors that affect risk of falls.  - [place] fall precautions as indicated by assessment.  - Educate pt/family on patient safety including physical limitations  - Instruct pt to call for assistance with activity based on assessment  - Modify environment to reduce risk of injury  - Provide assistive devices as appropriate  - Consider OT/PT consult to assist with strengthening/mobility  - Encourage toileting schedule  Outcome: Progressing     Problem: ANTEPARTUM/LABOR and DELIVERY  Goal: Maintain pregnancy as long as maternal and/or fetal condition is stable  Description: INTERVENTIONS:  - Maternal surveillance  - Fetal surveillance  - Monitor uterine activity  - Medications as ordered  - Bedrest  Outcome: Progressing  Goal: Anxiety is at manageable level  Description: INTERVENTIONS:  - [place] patient to unit/surroundings  - Establish a trusting relationship with patient  - Discuss possible complications or alterations in birth plan  - Explain treatment plan  - Explain testing/procedures prior to initiation  - Encourage participation in care  - Encourage verbalization of concerns/fears  - Identify coping mechanisms  - Administer/offer alternative therapies (Aroma therapy, distraction, guided imagery, massage, music therapy, therapeutic touch)  - Manage patient's environment (Avoid overstimulation of patient)  Outcome: Progressing  Goal: Demonstrates ability to cope with hospitalization/illness  Description: INTERVENTIONS:  - Encourage verbalization of feelings/concerns/expectations  - Provide quiet environment  - Assist patient to identify own strengths and abilities  - Encourage patient to set small goals for self  - Encourage participation in diversional activity  - Reinforce positive adaptation of new coping behaviors  - Include  patient/family/caregiver in decisions  Outcome: Progressing     Problem: Patient/Family Goals  Goal: Patient/Family Long Term Goal  Description: Patient's Long Term Goal: adequate pain management    Interventions:  - follow prescribed POC  - See additional Care Plan goals for specific interventions  Outcome: Progressing  Goal: Patient/Family Short Term Goal  Description: Patient's Short Term Goal: maintain healthy preg    Interventions:   - follow prescribed POC  - See additional Care Plan goals for specific interventions  Outcome: Progressing

## 2024-07-15 NOTE — PROGRESS NOTES
[facility]  Obstetrics and Gynecology    OB/GYN: Intrapartum Progress Note     SUBJECTIVE:  Patient is a 33 year old  female at 39w2d admitted for IOL. Patient reports doing well. Pain well controlled.     OBJECTIVE:  Vitals:    24 2300 24 0800 24 1245 24 1630   BP: 114/63 123/72 126/79 111/70   Pulse: 70 71 69 65   Resp:  16  16   Temp:  97.6 °F (36.4 °C) 98 °F (36.7 °C)    TempSrc:  Oral     Weight:       Height:           Physical Exam:  General: AAO. NAD.   Fundus + gravid.    FHT: moderate variability / 130 BPM / + accel / no decel   [place]: q 2-5 min  SVE: FT/50/-3    ASSESSMENT/PLAN:  Patient is a 33 year old  female at 39w2d admitted for IOL.     Doing well   Continue routine intrapartum care  Continue Cytotec per protocol every 4-6 hours, may consider additional doses if neded    GBS negative   IV pain medication PRN or epidural per request     Rosa Huff MD   [facility] - OBGYN

## 2024-07-15 NOTE — PLAN OF CARE
Problem: SAFETY ADULT - FALL  Goal: Free from fall injury  Description: INTERVENTIONS:  - Assess pt frequently for physical needs  - Identify cognitive and physical deficits and behaviors that affect risk of falls.  - [place] fall precautions as indicated by assessment.  - Educate pt/family on patient safety including physical limitations  - Instruct pt to call for assistance with activity based on assessment  - Modify environment to reduce risk of injury  - Provide assistive devices as appropriate  - Consider OT/PT consult to assist with strengthening/mobility  - Encourage toileting schedule  Outcome: Progressing     Problem: ANTEPARTUM/LABOR and DELIVERY  Goal: Maintain pregnancy as long as maternal and/or fetal condition is stable  Description: INTERVENTIONS:  - Maternal surveillance  - Fetal surveillance  - Monitor uterine activity  - Medications as ordered  - Bedrest  Outcome: Progressing  Goal: Anxiety is at manageable level  Description: INTERVENTIONS:  - [place] patient to unit/surroundings  - Establish a trusting relationship with patient  - Discuss possible complications or alterations in birth plan  - Explain treatment plan  - Explain testing/procedures prior to initiation  - Encourage participation in care  - Encourage verbalization of concerns/fears  - Identify coping mechanisms  - Administer/offer alternative therapies (Aroma therapy, distraction, guided imagery, massage, music therapy, therapeutic touch)  - Manage patient's environment (Avoid overstimulation of patient)  Outcome: Progressing  Goal: Demonstrates ability to cope with hospitalization/illness  Description: INTERVENTIONS:  - Encourage verbalization of feelings/concerns/expectations  - Provide quiet environment  - Assist patient to identify own strengths and abilities  - Encourage patient to set small goals for self  - Encourage participation in diversional activity  - Reinforce positive adaptation of new coping behaviors  - Include  patient/family/caregiver in decisions  Outcome: Progressing     Problem: Patient/Family Goals  Goal: Patient/Family Long Term Goal  Description: Patient's Long Term Goal: adequate pain management    Interventions:  - follow prescribed POC  - See additional Care Plan goals for specific interventions  Outcome: Progressing  Goal: Patient/Family Short Term Goal  Description: Patient's Short Term Goal: maintain healthy preg    Interventions:   - follow prescribed POC  - See additional Care Plan goals for specific interventions  Outcome: Progressing

## 2024-07-15 NOTE — ANESTHESIA PREPROCEDURE EVALUATION
PRE-OP EVALUATION    Patient Name: Shelby Andujar    Admit Diagnosis: Pregnancy (HCC) [Z34.90]    Pre-op Diagnosis: * No pre-op diagnosis entered *        Anesthesia Procedure: LABOR ANALGESIA    * No surgeons found in log *    Pre-op vitals reviewed.  Temp: 97.4 °F (36.3 °C)  Pulse: 64  Resp: 18  BP: 101/59  SpO2: 97 %  Body mass index is 28.51 kg/m².    Current medications reviewed.  Hospital Medications:  • oxyTOCIN in sodium chloride 0.9% (Pitocin) 30 Units/500mL infusion premix  0.5-20 jayson-units/min Intravenous Continuous   • [COMPLETED] lactated ringers IV bolus 1,000 mL  1,000 mL Intravenous Once   • fentaNYL-bupivacaine 2 mcg/mL-0.125% in sodium chloride 0.9% 100 mL EPIDURAL infusion premix  12 mL/hr Epidural Continuous   • fentaNYL (Sublimaze) 50 mcg/mL injection 100 mcg  100 mcg Epidural Once   • EPHEDrine (PF) 25 MG/5 ML injection 5 mg  5 mg Intravenous PRN   • nalbuphine (Nubain) 10 mg/mL injection 2.5 mg  2.5 mg Intravenous Q15 Min PRN   • lactated ringers infusion   Intravenous Continuous   • dextrose in lactated ringers 5% infusion   Intravenous PRN   • lactated ringers IV bolus 500 mL  500 mL Intravenous PRN   • acetaminophen (Tylenol Extra Strength) tab 500 mg  500 mg Oral Q6H PRN   • acetaminophen (Tylenol Extra Strength) tab 1,000 mg  1,000 mg Oral Q6H PRN   • ibuprofen (Motrin) tab 600 mg  600 mg Oral Once PRN   • ondansetron (Zofran) 4 MG/2ML injection 4 mg  4 mg Intravenous Q6H PRN   • oxyTOCIN in sodium chloride 0.9% (Pitocin) 30 Units/500mL infusion premix  62.5-900 jayson-units/min Intravenous Continuous   • terbutaline (Brethine) 1 MG/ML injection 0.25 mg  0.25 mg Subcutaneous PRN   • sodium citrate-citric acid (Bicitra) 500-334 MG/5ML oral solution 30 mL  30 mL Oral PRN   • [COMPLETED] misoprostol (CYTOTEC) partial tablets 25 mcg  25 mcg Vaginal Q4H   • hydrocortisone 1 % ointment   Topical BID   • HYDROmorphone (Dilaudid) 1 MG/ML injection 1 mg  1 mg Intravenous Q2H PRN   • calcium carbonate  (Tums) chewable tab 1,000 mg  1,000 mg Oral PRN   • acetaminophen (Tylenol Extra Strength) tab 1,000 mg  1,000 mg Oral Q6H PRN       Outpatient Medications:     Medications Prior to Admission   Medication Sig Dispense Refill Last Dose   • hydrOXYzine 25 MG Oral Tab Take 1 tablet (25 mg total) by mouth 3 (three) times daily as needed for Itching. 30 tablet 0 Past Month   • Ferrous Sulfate (IRON) 325 (65 Fe) MG Oral Tab Take by mouth.   7/13/2024   • Prenatal MV-Min-Fe Fum-FA-DHA (PRENATAL 1 OR) Take by mouth.   7/13/2024       Allergies: Patient has no known allergies.      Anesthesia Evaluation    Patient summary reviewed.    Anesthetic Complications  (-) history of anesthetic complications         GI/Hepatic/Renal    Negative GI/hepatic/renal ROS.                             Cardiovascular    Negative cardiovascular ROS.    Exercise tolerance: good     MET: >4                                           Endo/Other    Negative endo/other ROS.                              Pulmonary    Negative pulmonary ROS.                       Neuro/Psych    Negative neuro/psych ROS.                              Past Surgical History:   Procedure Laterality Date   • Colposcopy,bx cervix/endocerv curr  02/10/2022    cervical tissue with squamous metaplasia, unremarkable endocervical tissue. [facility]     Social History     Socioeconomic History   • Marital status:    Tobacco Use   • Smoking status: Never   • Smokeless tobacco: Never   Substance and Sexual Activity   • Alcohol use: Not Currently     Comment: socially   • Drug use: Never   Other Topics Concern   • Caffeine Concern No   • Exercise Yes   • Seat Belt Yes   • Special Diet No   • Stress Concern Yes   • Weight Concern Yes     History   Drug Use Unknown     Available pre-op labs reviewed.  Lab Results   Component Value Date    WBC 9.1 07/13/2024    RBC 3.83 07/13/2024    HGB 11.9 (L) 07/13/2024    HCT 34.0 (L) 07/13/2024    MCV 88.8 07/13/2024     MCH 31.1 2024    MCHC 35.0 2024    RDW 13.2 2024    .0 2024        Lab Results   Component Value Date    INR 0.96 2024         Airway      Mallampati: II  Mouth opening: 3 FB  TM distance: 4 - 6 cm  Neck ROM: full Cardiovascular    Cardiovascular exam normal.         Dental  Comment: Normal wear/minor imperfections and discoloration noted. No grossly weakened or damaged teeth on exam.           Pulmonary    Pulmonary exam normal.                 Other findings        ASA: 2   Plan: epidural           Comment: We discussed the combined spinal epidural labor epidural as a method of pain relief during labor. We discussed that an epidural is not needed for labor, it is optional. We discussed the benefits of less pain and the benefit that should the patient need a  we often can use the epidural for anesthesia. We discussed the risks that the epidural may not work as well as we like and we have to adjust it or even replace it during labor. We discussed the risk of bleeding or infection which could cause an epidural hematoma or abscess which are the most common causes of serious and permanent injury.We discussed the risk of a headache that usually shows up a day or two after the epidural. While the risk of serious complications is small, it is never zero. All questions were answered.                  Present on Admission:  **None**

## 2024-07-15 NOTE — PROGRESS NOTES
Labor Progress note    Subjective:   Mildly painful contractions  No LOF  No vaginal bleeding  +fetal movement    Objective:  Vitals:    07/15/24 0903 07/15/24 0905 07/15/24 0910 07/15/24 0913   BP: 121/78 121/75 98/59 101/56   BP Location:       Pulse: 72 99 64 65   Resp:       Temp:       TempSrc:       SpO2: 94%   98%   Weight:       Height:         Temp:  [97.5 °F (36.4 °C)-98 °F (36.7 °C)] 97.5 °F (36.4 °C)  Pulse:  [60-99] 65  Resp:  [16-20] 20  BP: ()/(56-79) 101/56  SpO2:  [94 %-98 %] 98 %    Urine Output        None                    Physical Exam  Gen A&O, NAD  CV regular rate  Lungs no increased WOB  Abd soft in between contractions  Ext: positive edema left ankle and bilateral knees with abrasions on bilateral knees. Tender to palpation over left ankle. negative calf tenderness bilaterally, Filippo's sign negative bilaterally   SVE: 3 to /-2 after AROMc with membrane sweep    CEFM: Cat 1    Recent Labs   Lab 24  2325   RBC 3.83   HGB 11.9*   HCT 34.0*   MCV 88.8   MCH 31.1   MCHC 35.0   RDW 13.2   NEPRELIM 6.23   WBC 9.1   .0         Assessment and Plan: 33 year old  at 39w3d presenting for IOL for term dates    #IOL/Labor:  - Admit 1 cm, s/p cytotec x3  - Pitocin started at 0040  - 0815: /-2 after AROM clear, VTX confrimed on check   - anesthesia:plan epidural     #FWB  - CEFM: category 1  - Presentation: VTX  - GBS status: neg    Other medical co-morbidities  #Left ankle sprain: ortho consult after delivery      Shilpa Jackson MD    ----------------------------------------------------    +vaginal bleeding  Prompting RN to check patient.  Forebag was palpated and broken and significant amount of blood and amniotic fluid.  Cat Ii tracing with variables but now cat 1 with moderate variability.    5 cm dilated still  Epidural in place  Discussed that she likely has an abruption that we would monitor bleeding and baby for clinical status.  Given bleeding has improved  and baby's status is reassuring, will continue with vaginal delivery

## 2024-07-15 NOTE — PLAN OF CARE
Problem: SAFETY ADULT - FALL  Goal: Free from fall injury  Description: INTERVENTIONS:  - Assess pt frequently for physical needs  - Identify cognitive and physical deficits and behaviors that affect risk of falls.  - [place] fall precautions as indicated by assessment.  - Educate pt/family on patient safety including physical limitations  - Instruct pt to call for assistance with activity based on assessment  - Modify environment to reduce risk of injury  - Provide assistive devices as appropriate  - Consider OT/PT consult to assist with strengthening/mobility  - Encourage toileting schedule  7/15/2024 1726 by Ashley Willard RN  Outcome: Completed  7/15/2024 0725 by Ashley Willard RN  Outcome: Progressing     Problem: ANTEPARTUM/LABOR and DELIVERY  Goal: Maintain pregnancy as long as maternal and/or fetal condition is stable  Description: INTERVENTIONS:  - Maternal surveillance  - Fetal surveillance  - Monitor uterine activity  - Medications as ordered  - Bedrest  7/15/2024 1726 by Ashley Willard RN  Outcome: Completed  7/15/2024 0725 by Ashley Willard RN  Outcome: Progressing  Goal: Anxiety is at manageable level  Description: INTERVENTIONS:  - [place] patient to unit/surroundings  - Establish a trusting relationship with patient  - Discuss possible complications or alterations in birth plan  - Explain treatment plan  - Explain testing/procedures prior to initiation  - Encourage participation in care  - Encourage verbalization of concerns/fears  - Identify coping mechanisms  - Administer/offer alternative therapies (Aroma therapy, distraction, guided imagery, massage, music therapy, therapeutic touch)  - Manage patient's environment (Avoid overstimulation of patient)  7/15/2024 1726 by Ahsley Willard RN  Outcome: Completed  7/15/2024 0725 by Ashley Willard RN  Outcome: Progressing  Goal: Demonstrates ability to cope with hospitalization/illness  Description: INTERVENTIONS:  - Encourage verbalization of  feelings/concerns/expectations  - Provide quiet environment  - Assist patient to identify own strengths and abilities  - Encourage patient to set small goals for self  - Encourage participation in diversional activity  - Reinforce positive adaptation of new coping behaviors  - Include patient/family/caregiver in decisions  7/15/2024 1726 by Ashley Willard RN  Outcome: Completed  7/15/2024 0725 by Ashley Willard RN  Outcome: Progressing     Problem: Patient/Family Goals  Goal: Patient/Family Long Term Goal  Description: Patient's Long Term Goal: Have an uncomplicated delivery   Interventions:  - follow prescribed POC  - See additional Care Plan goals for specific interventions  7/15/2024 1726 by Ashley Willard RN  Outcome: Completed  7/15/2024 0725 by Ashley Willard RN  Outcome: Progressing  Goal: Patient/Family Short Term Goal  Description: Patient's Short Term Goal: have adequate pain management     Interventions:   - follow prescribed POC  - See additional Care Plan goals for specific interventions  7/15/2024 1726 by Ashley Willard RN  Outcome: Completed  7/15/2024 0725 by Ashley Willard RN  Outcome: Progressing     Problem: BIRTH - VAGINAL/ SECTION  Goal: Fetal and maternal status remain reassuring during the birth process  Description: INTERVENTIONS:  - Monitor vital signs  - Monitor fetal heart rate  - Monitor uterine activity  - Monitor labor progression (vaginal delivery)  - DVT prophylaxis (C/S delivery)  - Surgical antibiotic prophylaxis (C/S delivery)  Outcome: Completed     Problem: PAIN - ADULT  Goal: Verbalizes/displays adequate comfort level or patient's stated pain goal  Description: INTERVENTIONS:  - Encourage pt to monitor pain and request assistance  - Assess pain using appropriate pain scale  - Administer analgesics based on type and severity of pain and evaluate response  - Implement non-pharmacological measures as appropriate and evaluate response  - Consider cultural and social  influences on pain and pain management  - Manage/alleviate anxiety  - Utilize distraction and/or relaxation techniques  - Monitor for opioid side effects  - Notify MD/LIP if interventions unsuccessful or patient reports new pain  - Anticipate increased pain with activity and pre-medicate as appropriate  Outcome: Completed     Problem: ANXIETY  Goal: Will report anxiety at manageable levels  Description: INTERVENTIONS:  - Administer medication as ordered  - Teach and rehearse alternative coping skills  - Provide emotional support with 1:1 interaction with staff  Outcome: Completed

## 2024-07-15 NOTE — TELEPHONE ENCOUNTER
----- Message from Clementina BOLANOS sent at 7/12/2024  1:25 PM CDT -----  Regarding: Induction date  Hello,  Please call the patient about the dates - her message has a different date from her scheduled induction. Please clarify.  NL

## 2024-07-15 NOTE — PLAN OF CARE
Problem: SAFETY ADULT - FALL  Goal: Free from fall injury  Description: INTERVENTIONS:  - Assess pt frequently for physical needs  - Identify cognitive and physical deficits and behaviors that affect risk of falls.  - [place] fall precautions as indicated by assessment.  - Educate pt/family on patient safety including physical limitations  - Instruct pt to call for assistance with activity based on assessment  - Modify environment to reduce risk of injury  - Provide assistive devices as appropriate  - Consider OT/PT consult to assist with strengthening/mobility  - Encourage toileting schedule  Outcome: Progressing     Problem: ANTEPARTUM/LABOR and DELIVERY  Goal: Maintain pregnancy as long as maternal and/or fetal condition is stable  Description: INTERVENTIONS:  - Maternal surveillance  - Fetal surveillance  - Monitor uterine activity  - Medications as ordered  - Bedrest  Outcome: Progressing  Goal: Anxiety is at manageable level  Description: INTERVENTIONS:  - [place] patient to unit/surroundings  - Establish a trusting relationship with patient  - Discuss possible complications or alterations in birth plan  - Explain treatment plan  - Explain testing/procedures prior to initiation  - Encourage participation in care  - Encourage verbalization of concerns/fears  - Identify coping mechanisms  - Administer/offer alternative therapies (Aroma therapy, distraction, guided imagery, massage, music therapy, therapeutic touch)  - Manage patient's environment (Avoid overstimulation of patient)  Outcome: Progressing  Goal: Demonstrates ability to cope with hospitalization/illness  Description: INTERVENTIONS:  - Encourage verbalization of feelings/concerns/expectations  - Provide quiet environment  - Assist patient to identify own strengths and abilities  - Encourage patient to set small goals for self  - Encourage participation in diversional activity  - Reinforce positive adaptation of new coping behaviors  - Include  patient/family/caregiver in decisions  Outcome: Progressing     Problem: Patient/Family Goals  Goal: Patient/Family Long Term Goal  Description: Patient's Long Term Goal: Have an uncomplicated delivery   Interventions:  - follow prescribed POC  - See additional Care Plan goals for specific interventions  Outcome: Progressing  Goal: Patient/Family Short Term Goal  Description: Patient's Short Term Goal: have adequate pain management     Interventions:   - follow prescribed POC  - See additional Care Plan goals for specific interventions  Outcome: Progressing

## 2024-07-15 NOTE — PROGRESS NOTES
Bedside report done with Jennifer MARTELL. PT received in stable condition. No new orders at this time.

## 2024-07-16 ENCOUNTER — TELEPHONE (OUTPATIENT)
Facility: CLINIC | Age: 34
End: 2024-07-16

## 2024-07-16 PROBLEM — Z34.90 PREGNANCY (HCC): Status: RESOLVED | Noted: 2024-07-13 | Resolved: 2024-07-16

## 2024-07-16 LAB
BASOPHILS # BLD AUTO: 0.04 X10(3) UL (ref 0–0.2)
BASOPHILS NFR BLD AUTO: 0.3 %
EOSINOPHIL # BLD AUTO: 0.07 X10(3) UL (ref 0–0.7)
EOSINOPHIL NFR BLD AUTO: 0.6 %
ERYTHROCYTE [DISTWIDTH] IN BLOOD BY AUTOMATED COUNT: 13.2 %
HCT VFR BLD AUTO: 33.7 %
HGB BLD-MCNC: 11.5 G/DL
IMM GRANULOCYTES # BLD AUTO: 0.16 X10(3) UL (ref 0–1)
IMM GRANULOCYTES NFR BLD: 1.3 %
LYMPHOCYTES # BLD AUTO: 1.91 X10(3) UL (ref 1–4)
LYMPHOCYTES NFR BLD AUTO: 15.1 %
MCH RBC QN AUTO: 30.9 PG (ref 26–34)
MCHC RBC AUTO-ENTMCNC: 34.1 G/DL (ref 31–37)
MCV RBC AUTO: 90.6 FL
MONOCYTES # BLD AUTO: 0.88 X10(3) UL (ref 0.1–1)
MONOCYTES NFR BLD AUTO: 7 %
NEUTROPHILS # BLD AUTO: 9.57 X10 (3) UL (ref 1.5–7.7)
NEUTROPHILS # BLD AUTO: 9.57 X10(3) UL (ref 1.5–7.7)
NEUTROPHILS NFR BLD AUTO: 75.7 %
PLATELET # BLD AUTO: 129 10(3)UL (ref 150–450)
PLATELETS.RETICULATED NFR BLD AUTO: 9.7 % (ref 0–7)
RBC # BLD AUTO: 3.72 X10(6)UL
WBC # BLD AUTO: 12.6 X10(3) UL (ref 4–11)

## 2024-07-16 NOTE — TELEPHONE ENCOUNTER
Patient called and needed short term disability forms uploaded to her [employer]. I told her that they had been completed back in March and uploaded to her [employer]. She asked to send them to her again. I told her that I would send them to her. Patient expressed understanding.

## 2024-07-16 NOTE — PLAN OF CARE
Admitted to 1113. Id bands verified by 2 Rns. Assessment and vitals completed. Oriented to room and call light. POC discussed with pt. All questions answered.    Problem: POSTPARTUM  Goal: Long Term Goal:Experiences normal postpartum course  Description: INTERVENTIONS:  - Assess and monitor vital signs and lab values.  - Assess fundus and lochia.  - Provide ice/sitz baths for perineum discomfort.  - Monitor healing of incision/episiotomy/laceration, and assess for signs and symptoms of infection and hematoma.  - Assess bladder function and monitor for bladder distention.  - Provide/instruct/assist with pericare as needed.  - Provide VTE prophylaxis as needed.  - Monitor bowel function.  - Encourage ambulation and provide assistance as needed.  - Assess and monitor emotional status and provide social service/psych resources as needed.  - Utilize standard precautions and use personal protective equipment as indicated. Ensure aseptic care of all intravenous lines and invasive tubes/drains.  - Obtain immunization and exposure to communicable diseases history.  Outcome: Progressing  Goal: Optimize infant feeding at the breast  Description: INTERVENTIONS:  - Initiate breast feeding within first hour after birth.   - Monitor effectiveness of current breast feeding efforts.  - Assess support systems available to mother/family.  - Identify cultural beliefs/practices regarding lactation, letdown techniques, maternal food preferences.  - Assess mother's knowledge and previous experience with breast feeding.  - Provide information as needed about early infant feeding cues (e.g., rooting, lip smacking, sucking fingers/hand) versus late cue of crying.  - Discuss/demonstrate breast feeding aids (e.g., infant sling, nursing footstool/pillows, and breast pumps).  - Encourage mother/other family members to express feelings/concerns, and actively listen.  - Educate father/SO about benefits of breast feeding and how to manage common  lactation challenges.  - Recommend avoidance of specific medications or substances incompatible with breast feeding.  - Assess and monitor for signs of nipple pain/trauma.  - Instruct and provide assistance with proper latch.  - Review techniques for milk expression (breast pumping) and storage of breast milk. Provide pumping equipment/supplies, instructions and assistance, as needed.  - Encourage rooming-in and breast feeding on demand.  - Encourage skin-to-skin contact.  - Provide LC support as needed.  - Assess for and manage engorgement.  - Provide breast feeding education handouts and information on community breast feeding support.   Outcome: Progressing  Goal: Establishment of adequate milk supply with medication/procedure interruptions  Description: INTERVENTIONS:  - Review techniques for milk expression (breast pumping).   - Provide pumping equipment/supplies, instructions, and assistance until it is safe to breastfeed infant.  Outcome: Progressing  Goal: Experiences normal breast weaning course  Description: INTERVENTIONS:  - Assess for and manage engorgement.  - Instruct on breast care.  - Provide comfort measures.  Outcome: Progressing  Goal: Appropriate maternal -  bonding  Description: INTERVENTIONS:  - Assess caregiver- interactions.  - Assess caregiver's emotional status and coping mechanisms.  - Encourage caregiver to participate in  daily care.  - Assess support systems available to mother/family.  - Provide /case management support as needed.  Outcome: Progressing

## 2024-07-16 NOTE — PLAN OF CARE
Problem: POSTPARTUM  Goal: Optimize infant feeding at the breast  Description: INTERVENTIONS:  - Initiate breast feeding within first hour after birth.   - Monitor effectiveness of current breast feeding efforts.  - Assess support systems available to mother/family.  - Identify cultural beliefs/practices regarding lactation, letdown techniques, maternal food preferences.  - Assess mother's knowledge and previous experience with breast feeding.  - Provide information as needed about early infant feeding cues (e.g., rooting, lip smacking, sucking fingers/hand) versus late cue of crying.  - Discuss/demonstrate breast feeding aids (e.g., infant sling, nursing footstool/pillows, and breast pumps).  - Encourage mother/other family members to express feelings/concerns, and actively listen.  - Educate father/SO about benefits of breast feeding and how to manage common lactation challenges.  - Recommend avoidance of specific medications or substances incompatible with breast feeding.  - Assess and monitor for signs of nipple pain/trauma.  - Instruct and provide assistance with proper latch.  - Review techniques for milk expression (breast pumping) and storage of breast milk. Provide pumping equipment/supplies, instructions and assistance, as needed.  - Encourage rooming-in and breast feeding on demand.  - Encourage skin-to-skin contact.  - Provide LC support as needed.  - Assess for and manage engorgement.  - Provide breast feeding education handouts and information on community breast feeding support.   Outcome: Progressing     Problem: POSTPARTUM  Goal: Establishment of adequate milk supply with medication/procedure interruptions  Description: INTERVENTIONS:  - Review techniques for milk expression (breast pumping).   - Provide pumping equipment/supplies, instructions, and assistance until it is safe to breastfeed infant.  Outcome: Progressing     Problem: POSTPARTUM  Goal: Experiences normal breast weaning  course  Description: INTERVENTIONS:  - Assess for and manage engorgement.  - Instruct on breast care.  - Provide comfort measures.  Outcome: Progressing     Problem: POSTPARTUM  Goal: Appropriate maternal -  bonding  Description: INTERVENTIONS:  - Assess caregiver- interactions.  - Assess caregiver's emotional status and coping mechanisms.  - Encourage caregiver to participate in  daily care.  - Assess support systems available to mother/family.  - Provide /case management support as needed.  Outcome: Progressing

## 2024-07-16 NOTE — PROGRESS NOTES
Patient transferred to mother baby via wheelchair with stable infant in arms. Report given to JAYSHREE Adorno.

## 2024-07-16 NOTE — PROGRESS NOTES
Labor Analgesia Follow Up Note    Patient underwent epidural anesthesia for labor analgesia,    Placenta Date/Time: 7/15/2024  4:57 PM     Delivery Date/Time:: 7/15/2024   4:56 PM     /61 (BP Location: Right arm)   Pulse 55   Temp 98.2 °F (36.8 °C) (Oral)   Resp 12   Ht 1.702 m (5' 7\")   Wt 82.6 kg (182 lb)   LMP 10/13/2023 (Exact Date)   SpO2 98%   Breastfeeding Yes   BMI 28.51 kg/m²     Assessment:  Patient seen and no apparent anesthesia related complications.    Thank you for asking us to participate in the care of your patient.

## 2024-07-17 VITALS
DIASTOLIC BLOOD PRESSURE: 64 MMHG | WEIGHT: 182 LBS | HEART RATE: 71 BPM | HEIGHT: 67 IN | RESPIRATION RATE: 16 BRPM | BODY MASS INDEX: 28.56 KG/M2 | TEMPERATURE: 98 F | SYSTOLIC BLOOD PRESSURE: 110 MMHG | OXYGEN SATURATION: 98 %

## 2024-07-17 NOTE — PROGRESS NOTES
Patient up per wheelchair, going home with spouse and her baby in mom's arms and parents verbalized that they know how to put baby in car seat, since they have the type of car seat that stays in the car, with their belongings

## 2024-07-17 NOTE — PROGRESS NOTES
PPD#1    Cramping is manageable, lochia minimal. Tolerating PO, voiding without difficulty.  Ankle feels less swollen today     Vitals:    24 1941   BP: 105/65   Pulse: 61   Resp: 16   Temp: 97.7 °F (36.5 °C)     Recent Labs   Lab 24  2325 07/15/24  1557 24  0727   RBC 3.83 4.19 3.72*   HGB 11.9* 13.1 11.5*   HCT 34.0* 37.9 33.7*   MCV 88.8 90.5 90.6   MCH 31.1 31.3 30.9   MCHC 35.0 34.6 34.1   RDW 13.2 13.3 13.2   NEPRELIM 6.23 10.06* 9.57*   WBC 9.1 12.4* 12.6*   .0 140.0* 129.0*         Gen: NAD, appears well  Uterus: firm, below umbilicus    33 year old  now PPD#1 s/p . Was admitted for IOL after having fallen with large contusion on knee and sprained ankle (presumed impact would be enough to cause potential placental abruption). Delivery uncomplicated  - AFVSS  - doing well  - Rh pos  - ortho saw pt & had X ray, no fracture - recommended walking boot x2-3wk as she feels based off sx, can be weightbearing as tolerated  - plan for discharge tomorrow. Discussed discharge instructions with pt and plan for postpartum follow up in 6 weeks

## 2024-07-17 NOTE — PROGRESS NOTES
Patient up and about, AOx4, DC order in, and postpartum care and  care reviewed and completed , and will make the follow up appointment, spouse very helpful at bedside, and patient verbalized and demonstrated understanding of instructions, patient signed infant custody release electronically for discharge

## 2024-07-17 NOTE — DISCHARGE INSTRUCTIONS
For pain: take tylenol 1000 mg every 6 hours as needed, ibuprofen 600 mg every 6 hours as needed    For constipation: take colace (docusate) twice daily as needed. Can also take Miralax or Milk of Magnesia nightly as needed (over the counter)    Nothing in the vagina for 6 weeks.     Call office for fever 100.4 or higher, increased vaginal bleeding soaking greater than 1 pad per hour, increased abdominal/pelvic pain, shortness of breath, chest pain, leg pain or swelling, persistent or severe headache, vision changes, persistent or severe upper abdominal pain, feeling depressed or extremely anxious, thoughts of self harm or harming infant(s).    Call office to schedule postpartum visit in 6 weeks.   Please call with other questions or concerns.

## 2024-07-19 ENCOUNTER — TELEPHONE (OUTPATIENT)
Dept: OBGYN UNIT | Facility: HOSPITAL | Age: 34
End: 2024-07-19

## 2024-07-19 NOTE — PROGRESS NOTES
Reviewed self and infant care w / mom, she verbalizes understanding of instructions reviewed. Encourage to follow up w/ MDs as directed and w/ questions/concerns. Br and pumping, care reviewed, ped appt later this am, enc to join ct and flyer sent on my chart, marcial also on call.

## 2024-08-28 ENCOUNTER — POSTPARTUM (OUTPATIENT)
Facility: CLINIC | Age: 34
End: 2024-08-28
Payer: COMMERCIAL

## 2024-08-28 VITALS
SYSTOLIC BLOOD PRESSURE: 110 MMHG | HEART RATE: 69 BPM | DIASTOLIC BLOOD PRESSURE: 72 MMHG | WEIGHT: 156.63 LBS | BODY MASS INDEX: 24.58 KG/M2 | HEIGHT: 67 IN

## 2024-08-28 DIAGNOSIS — N81.89 PELVIC FLOOR WEAKNESS: ICD-10-CM

## 2024-08-28 DIAGNOSIS — R10.2 PELVIC PAIN: ICD-10-CM

## 2024-08-28 NOTE — PROGRESS NOTES
[facility]  Obstetrics and Gynecology   History & Physical      Chief complaint:   Chief Complaint   Patient presents with    Postpartum Care     6 week PP   Vaginal delivery 7/15/24 to a baby boy   Pt still has some pelvic pain        Subjective:     HPI: Shelby Andujar is a 34 year old  s/p VAVD  on 07/15/2024 for IOL after experiencing a fall is presenting for post partum check.    - OB history complicated by:  Iron deficiency anemia     Post partum course:  - Vaginal bleeding: not yet   - Lacerations 2nd degree: recently it has started to hurt when she poops - a little blood in the stool  - Diet: recently has nausea  - BM: normal   - Urinary normal   - Breastfeeding/bottlefeeding: mostly breast milk, but occasional supplement   - Baby: meeting milestones - carmine   - Mood: well   - Return to sex?: no  - Birth control: condoms       ROS negative unless otherwise stated above    OB History  OB History    Para Term  AB Living   1 1 1 0 0 1   SAB IAB Ectopic Multiple Live Births   0 0 0 0 1     OB History    Para Term  AB Living   1 1 1     1   SAB IAB Ectopic Multiple Live Births         0 1      # Outcome Date GA Lbr Geronimo/2nd Weight Sex Type Anes PTL Lv   1 Term 07/15/24 39w3d 07:53 / 00:51 6 lb 11.2 oz (3.04 kg) M Vag-Vacuum EPI N HARI      Complications: Abruptio Placenta, Variable decelerations       Depression Scale Total: 0 (2024 12:15 PM)    PHQ-2 not done in last 12 months! Please administer and refresh!        Meds:  Current Outpatient Medications on File Prior to Visit   Medication Sig Dispense Refill    Prenatal MV-Min-Fe Fum-FA-DHA (PRENATAL 1 OR) Take by mouth.      hydrOXYzine 25 MG Oral Tab Take 1 tablet (25 mg total) by mouth 3 (three) times daily as needed for Itching. (Patient not taking: Reported on 2024) 30 tablet 0     No current facility-administered medications on file prior to visit.       PMH:  Past Medical History:    ASCUS with positive  high risk HPV cervical    per [facility]. [facility]    Iron deficiency anemia during pregnancy (HCC)    Migraine with aura    sparkles in vision.    Pap smear for cervical cancer screening    Patient reports normal pap smear in [place]       All:  No Known Allergies    PSH:  Past Surgical History:   Procedure Laterality Date    Colposcopy,bx cervix/endocerv curr  02/10/2022    cervical tissue with squamous metaplasia, unremarkable endocervical tissue. Shilpa Walker - [facility]       Social History:  Social History     Socioeconomic History    Marital status:    Tobacco Use    Smoking status: Never    Smokeless tobacco: Never   Substance and Sexual Activity    Alcohol use: Not Currently     Comment: socially    Drug use: Never    Sexual activity: Not Currently   Other Topics Concern    Caffeine Concern No    Exercise Yes    Seat Belt Yes    Special Diet No    Stress Concern Yes    Weight Concern Yes     Social Determinants of Health     Financial Resource Strain: Low Risk  (7/13/2024)    Financial Resource Strain     Difficulty of Paying Living Expenses: Not hard at all     Med Affordability: No   Food Insecurity: No Food Insecurity (7/13/2024)    Food Insecurity     Food Insecurity: Never true   Transportation Needs: No Transportation Needs (7/13/2024)    Transportation Needs     Lack of Transportation: No   Stress: No Stress Concern Present (7/13/2024)    Stress     Feeling of Stress : No   Housing Stability: Low Risk  (7/13/2024)    Housing Stability     Housing Instability: No        Family History:  Family History   Problem Relation Age of Onset    Gastrointestinal Cancer Father     Cancer Father        Immunization History:  Immunization History   Administered Date(s) Administered    Covid-19 Vaccine Moderna 100 mcg/0.5 ml 04/13/2021, 05/11/2021    FLUZONE 6 months and older PFS 0.5 ml (53539) 10/25/2019, 11/06/2021    Hpv Virus Vaccine 9 [facility] 01/12/2022, 03/14/2022, 06/06/2022    TDAP 04/26/2024          Objective:     Vitals:    08/28/24 1212   BP: 110/72   Pulse: 69   Weight: 156 lb 9.6 oz (71 kg)   Height: 67\"       Body mass index is 24.53 kg/m².    Physical Exam:     General: normal appearance  HEENT: normocephalic, no male pattern baldness, no acne  Breast: normal contour  Respiratory: normal work of breathing, no extra use of accessory muscles  Cardiac: normal rate  Abdominal: Nontender to palpation, no masses palpated,   MSK: normal range of motion  Neuro: normal movement, normal sensory  Skin: no abnormalities seen    Pelvic Exam:  - normal appearing vulva, perineum, anus  - normal appearing urethral meatus, urethra  - normal appearing vagina, well estrogenized with ruggae, physiologic discharge, scarring palpated where 2nd degree is   - multiparous cervix without masses       Labs:  Lab Results   Component Value Date    WBC 12.6 (H) 07/16/2024    RBC 3.72 (L) 07/16/2024    HGB 11.5 (L) 07/16/2024    HCT 33.7 (L) 07/16/2024    MCV 90.6 07/16/2024    MCH 30.9 07/16/2024    MCHC 34.1 07/16/2024    RDW 13.2 07/16/2024    .0 (L) 07/16/2024        No results found for: \"GLU\", \"BUN\", \"BUNCREA\", \"CREATSERUM\", \"ANIONGAP\", \"GFR\", \"GFRNAA\", \"GFRAA\", \"CA\", \"OSMOCALC\", \"ALKPHO\", \"AST\", \"ALT\", \"ALKPHOS\", \"BILT\", \"TP\", \"ALB\", \"GLOBULIN\", \"AGRATIO\", \"NA\", \"K\", \"CL\", \"CO2\"    No results found for: \"CHOLEST\", \"TRIG\", \"HDL\", \"LDL\", \"VLDL\", \"TCHDLRATIO\", \"NONHDLC\", \"CHOLHDLRATIO\", \"CALCNONHDL\"     No results found for: \"T4F\", \"TSH\", \"TSHT4\", \"FSH\", \"LH\", \"PL\", \"AMH\"     No results found for: \"EAG\", \"A1C\"      Imaging:  US PREGNANCY LTD (CPT=76815)    Result Date: 7/14/2024  CONCLUSION:  Live intrauterine gestation.  No evidence of placental abruption.  Agree with preliminary radiology report from [facility].      LOCATION:  [place] [id number]    Dictated by (CST): Terrell Singh MD on 7/14/2024 at 8:56 AM     Finalized by (CST): Terrell Singh MD on 7/14/2024 at 8:59 AM       XR KNEE (1 OR 2 VIEWS), RIGHT  (ZZB=21931)    Result Date: 2024  CONCLUSION:   Negative for fracture or malalignment.  Normal mineralization.  Joint spaces are preserved.  No patellar subluxation.  No joint effusion.  Mild infrapatellar soft tissue swelling.    LOCATION:  [facility]   Dictated by (CST): Chad Rodriguez MD on 2024 at 7:00 AM     Finalized by (CST): Chad Rodriguez MD on 2024 at 7:09 AM       XR KNEE (1 OR 2 VIEWS), LEFT (CPT=73560)    Result Date: 2024  CONCLUSION:   Negative for fracture or malalignment.  Normal mineralization.  Joint spaces are preserved.  No patellar subluxation.  No joint effusion or focal soft tissue swelling.     LOCATION:  [facility]   Dictated by (CST): Chad Rodriguez MD on 2024 at 6:59 AM     Finalized by (CST): Chad Rodriguez MD on 2024 at 7:00 AM       XR ANKLE (2 VIEW), LEFT (CPT=73600)    Result Date: 2024  CONCLUSION:   Negative for fracture or malalignment.  The ankle mortise and joint spaces of the hindfoot are preserved.  Mild soft tissue swelling about the lateral malleolus.   LOCATION:  [facility]   Dictated by (CST): Chad Rodriguez MD on 2024 at 6:59 AM     Finalized by (CST): Chad Rodriguez MD on 2024 at 6:59 AM         Assessment:     Shelby Andujar is a 34 year old  s/p VAVD  on 07/15/2024 for IOL after experiencing a fall is presenting for post partum check.    #post partum  - meeting goals of care  - plan follow up for annual well woman exam    #cervical cancer screen  - last pap smear: 2023 in [place] - normal  HPV and cytology  - next due:      #pelvic floor weakness and pain  [ ] pelvic PT   [ ] plan follow up 2 weeks    #Lifestyle counseling based on recommendations from the American College of Lifestyle Medicine  1) Nutrition: extensive scientific evidence supports a diet that is predominantly whole-food and plant based as an important strategy in health optimization.  Such a diet is rich in fiber, antioxidants and is nutrient dense (ex. Minimally processed  vegetables, fruits, whole grains, legumes, nuts and seeds)  2) Physical activity: at least 150 minutes of moderate exercise per week (anything that gets your heart beating faster).  You could divide this time into 30 minutes per day for 5 days.  2 of these days should be devoted to whole body muscle-strengthening/building activities (weight lifting, for example).  3) Sleep: 7 to 9 hours per night of restorative sleep.  You can use black out curtains, white noise machine and minimize screen time to do this.    4) Continue to avoid or limit risky substances like alcohol, nicotine, vaping, drugs, prescription opioids.  If you need help - through medication or counseling - to do this, please contact me so I can get you a referral or resources to support you.  5) Stress: Continue developing coping strategies to decrease stress.  6) Leverage the power of relationships and social networks to help reinforce health behaviors.  Studies show people are more successful at achieving health goals if the people they live with are supporting and even working towards those same health goals.    Return of care in 2 weeks    Shilpa Jackson MD   [facility] - OBGYN    Note to patient and family:  The 21st Century Cures Act makes medical notes available to patients in the interest of transparency.  However, please be advised that this is a medical document.  It is intended as a peer to peer communication.  It is written in medical language and may contain abbreviations or verbiage that are technical and unfamiliar.  It may appear blunt or direct.  Medical documents are intended to carry relevant information, facts as evident, and the clinical opinion of the practitioner.

## 2024-09-11 ENCOUNTER — POSTPARTUM (OUTPATIENT)
Facility: CLINIC | Age: 34
End: 2024-09-11
Payer: COMMERCIAL

## 2024-09-11 VITALS
HEART RATE: 100 BPM | HEIGHT: 67 IN | BODY MASS INDEX: 24.99 KG/M2 | DIASTOLIC BLOOD PRESSURE: 66 MMHG | SYSTOLIC BLOOD PRESSURE: 108 MMHG | WEIGHT: 159.19 LBS

## 2024-09-11 DIAGNOSIS — N95.2 VAGINAL ATROPHY: ICD-10-CM

## 2024-09-11 DIAGNOSIS — N81.89 PELVIC FLOOR WEAKNESS: ICD-10-CM

## 2024-09-11 DIAGNOSIS — R10.2 PELVIC PAIN: ICD-10-CM

## 2024-09-11 PROCEDURE — 99459 PELVIC EXAMINATION: CPT | Performed by: STUDENT IN AN ORGANIZED HEALTH CARE EDUCATION/TRAINING PROGRAM

## 2024-09-11 PROCEDURE — 99213 OFFICE O/P EST LOW 20 MIN: CPT | Performed by: STUDENT IN AN ORGANIZED HEALTH CARE EDUCATION/TRAINING PROGRAM

## 2024-09-11 RX ORDER — ESTRADIOL 0.1 MG/G
CREAM VAGINAL
Qty: 42.5 G | Refills: 0 | Status: SHIPPED | OUTPATIENT
Start: 2024-09-11

## 2024-09-11 NOTE — PROGRESS NOTES
[facility]  Obstetrics and Gynecology   History & Physical      Chief complaint:   Chief Complaint   Patient presents with    Postpartum Care     Patient delivered on 7/15/24, Male, Vag-Vac,breastfeeding    Patient reports for She would like a referral for Pelvic floor PT    Patient reports she had cramping that felt like \"contractions one week ago that lasted 4 days       Subjective:     HPI: Shelby Andujar is a 34 year old  s/p VAVD  on 07/15/2024 for IOL after experiencing a fall is presenting for post partum check.    - OB history complicated by:  Iron deficiency anemia     Post partum course:  - Vaginal bleeding: not yet   - Lacerations 2nd degree: not as painful anymore to go to bathroom   - Diet: recently has nausea  - BM: normal   - Urinary normal   - Breastfeeding  - Baby: meeting milestones - carmine   - Mood: well   - Return to sex?: no - scared to restart  - Birth control: condoms       ROS negative unless otherwise stated above    OB History  OB History    Para Term  AB Living   1 1 1 0 0 1   SAB IAB Ectopic Multiple Live Births   0 0 0 0 1     OB History    Para Term  AB Living   1 1 1     1   SAB IAB Ectopic Multiple Live Births         0 1      # Outcome Date GA Lbr Geronimo/2nd Weight Sex Type Anes PTL Lv   1 Term 07/15/24 39w3d 07:53 / 00:51 6 lb 11.2 oz (3.04 kg) M Vag-Vacuum EPI N HARI      Complications: Abruptio Placenta, Variable decelerations       Depression Scale Total: 0 (2024 12:15 PM)    PHQ-2 not done in last 12 months! Please administer and refresh!        Meds:  Current Outpatient Medications on File Prior to Visit   Medication Sig Dispense Refill    Prenatal MV-Min-Fe Fum-FA-DHA (PRENATAL 1 OR) Take by mouth.      hydrOXYzine 25 MG Oral Tab Take 1 tablet (25 mg total) by mouth 3 (three) times daily as needed for Itching. (Patient not taking: Reported on 2024) 30 tablet 0     No current facility-administered medications on file prior to  visit.       PMH:  Past Medical History:    ASCUS with positive high risk HPV cervical    per [facility]. [facility]    Iron deficiency anemia during pregnancy (HCC)    Migraine with aura    [facility] in [facility]    Pap smear for cervical cancer screening    Patient reports normal pap smear in [place]       All:  No Known Allergies    PSH:  Past Surgical History:   Procedure Laterality Date    Colposcopy,bx cervix/endocerv curr  02/10/2022    cervical tissue with squamous metaplasia, unremarkable endocervical tissue. Shilpa Walker - [facility]       Social History:  Social History     Socioeconomic History    Marital status:    Tobacco Use    Smoking status: Never    Smokeless tobacco: Never   Substance and Sexual Activity    Alcohol use: Not Currently     Comment: socially    Drug use: Never    Sexual activity: Not Currently   Other Topics Concern    Caffeine Concern No    Exercise Yes    Seat Belt Yes    Special Diet No    Stress Concern Yes    Weight Concern Yes     Social Determinants of Health     Financial Resource Strain: Low Risk  (7/13/2024)    Financial Resource Strain     Difficulty of Paying Living Expenses: Not hard at all     Med Affordability: No   Food Insecurity: No Food Insecurity (7/13/2024)    Food Insecurity     Food Insecurity: Never true   Transportation Needs: No Transportation Needs (7/13/2024)    Transportation Needs     Lack of Transportation: No   Stress: No Stress Concern Present (7/13/2024)    Stress     Feeling of Stress : No   Housing Stability: Low Risk  (7/13/2024)    Housing Stability     Housing Instability: No        Family History:  Family History   Problem Relation Age of Onset    Gastrointestinal Cancer Father     Cancer Father        Immunization History:  Immunization History   Administered Date(s) Administered    Covid-19 Vaccine Moderna 100 mcg/0.5 ml 04/13/2021, 05/11/2021    FLUZONE 6 months and older PFS 0.5 ml (94681) 10/25/2019, 11/06/2021    Hpv Virus Vaccine 9  [facility] 01/12/2022, 03/14/2022, 06/06/2022    TDAP 04/26/2024         Objective:     Vitals:    09/11/24 1519   BP: 108/66   Pulse: 100   Weight: 159 lb 3.2 oz (72.2 kg)   Height: 67\"         Body mass index is 24.93 kg/m².    Physical Exam:     General: normal appearance  HEENT: normocephalic, no male pattern baldness, no acne  Breast: normal contour  Respiratory: normal work of breathing, no extra use of accessory muscles  Cardiac: normal rate  Abdominal: Nontender to palpation, no masses palpated,   MSK: normal range of motion  Neuro: normal movement, normal sensory  Skin: no abnormalities seen    Pelvic Exam:  - normal appearing vulva, perineum, anus  - normal appearing urethral meatus, urethra  - normal appearing vagina, well estrogenized with ruggae, physiologic discharge  - multiparous cervix without masses       Labs:  Lab Results   Component Value Date    WBC 12.6 (H) 07/16/2024    RBC 3.72 (L) 07/16/2024    HGB 11.5 (L) 07/16/2024    HCT 33.7 (L) 07/16/2024    MCV 90.6 07/16/2024    MCH 30.9 07/16/2024    MCHC 34.1 07/16/2024    RDW 13.2 07/16/2024    .0 (L) 07/16/2024        No results found for: \"GLU\", \"BUN\", \"BUNCREA\", \"CREATSERUM\", \"ANIONGAP\", \"GFR\", \"GFRNAA\", \"GFRAA\", \"CA\", \"OSMOCALC\", \"ALKPHO\", \"AST\", \"ALT\", \"ALKPHOS\", \"BILT\", \"TP\", \"ALB\", \"GLOBULIN\", \"AGRATIO\", \"NA\", \"K\", \"CL\", \"CO2\"    No results found for: \"CHOLEST\", \"TRIG\", \"HDL\", \"LDL\", \"VLDL\", \"TCHDLRATIO\", \"NONHDLC\", \"CHOLHDLRATIO\", \"CALCNONHDL\"     No results found for: \"T4F\", \"TSH\", \"TSHT4\", \"FSH\", \"LH\", \"PL\", \"AMH\"     No results found for: \"EAG\", \"A1C\"      Imaging:  US PREGNANCY LTD (CPT=76815)    Result Date: 7/14/2024  CONCLUSION:  Live intrauterine gestation.  No evidence of placental abruption.  Agree with preliminary radiology report from [facility].      LOCATION:  [place] [id number]    Dictated by (CST): Terrell Singh MD on 7/14/2024 at 8:56 AM     Finalized by (CST): Terrell Singh MD on 7/14/2024 at 8:59 AM       XR  KNEE (1 OR 2 VIEWS), RIGHT (CPT=73560)    Result Date: 2024  CONCLUSION:   Negative for fracture or malalignment.  Normal mineralization.  Joint spaces are preserved.  No patellar subluxation.  No joint effusion.  Mild infrapatellar soft tissue swelling.    LOCATION:  [facility]   Dictated by (CST): Chad Rodriguez MD on 2024 at 7:00 AM     Finalized by (CST): Chad Rodriguez MD on 2024 at 7:09 AM       XR KNEE (1 OR 2 VIEWS), LEFT (CPT=73560)    Result Date: 2024  CONCLUSION:   Negative for fracture or malalignment.  Normal mineralization.  Joint spaces are preserved.  No patellar subluxation.  No joint effusion or focal soft tissue swelling.     LOCATION:  [facility]   Dictated by (CST): Chad Rodriguez MD on 2024 at 6:59 AM     Finalized by (CST): Chad Rodriguez MD on 2024 at 7:00 AM       XR ANKLE (2 VIEW), LEFT (CPT=73600)    Result Date: 2024  CONCLUSION:   Negative for fracture or malalignment.  The ankle mortise and joint spaces of the hindfoot are preserved.  Mild soft tissue swelling about the lateral malleolus.   LOCATION:  [facility]   Dictated by (CST): Chad Rodriguez MD on 2024 at 6:59 AM     Finalized by (CST): Chad Rodriguez MD on 2024 at 6:59 AM         Assessment:     Shelby Andujar is a 34 year old  s/p VAVD  on 07/15/2024 for IOL after experiencing a fall is presenting for post partum check.    #post partum  - meeting goals of care  - plan follow up for annual well woman exam    #cervical cancer screen  - last pap smear: 2023 in [place] - normal  HPV and cytology  - next due:      #pelvic floor weakness and pain  [ ] pelvic PT   [ ] plan follow up 2 weeks    #vaginal atrophy from lactation  [ ] vaginal estrogen to pharmacy  [ ] can try vaginal moisturizer (vagifem) 2x per week or water based lubricant during sex ([place]).  Also discussed benefits of using coconut oil    #Lifestyle counseling based on recommendations from the American College of Lifestyle Medicine  1)  Nutrition: extensive scientific evidence supports a diet that is predominantly whole-food and plant based as an important strategy in health optimization.  Such a diet is rich in fiber, antioxidants and is nutrient dense (ex. Minimally processed vegetables, fruits, whole grains, legumes, nuts and seeds)  2) Physical activity: at least 150 minutes of moderate exercise per week (anything that gets your heart beating faster).  You could divide this time into 30 minutes per day for 5 days.  2 of these days should be devoted to whole body muscle-strengthening/building activities (weight lifting, for example).  3) Sleep: 7 to 9 hours per night of restorative sleep.  You can use black out curtains, white noise machine and minimize screen time to do this.    4) Continue to avoid or limit risky substances like alcohol, nicotine, vaping, drugs, prescription opioids.  If you need help - through medication or counseling - to do this, please contact me so I can get you a referral or resources to support you.  5) Stress: Continue developing coping strategies to decrease stress.  6) Leverage the power of relationships and social networks to help reinforce health behaviors.  Studies show people are more successful at achieving health goals if the people they live with are supporting and even working towards those same health goals.    Shilpa Jackson MD   [facility] - OBGYN    Note to patient and family:  The 21st Century Cures Act makes medical notes available to patients in the interest of transparency.  However, please be advised that this is a medical document.  It is intended as a peer to peer communication.  It is written in medical language and may contain abbreviations or verbiage that are technical and unfamiliar.  It may appear blunt or direct.  Medical documents are intended to carry relevant information, facts as evident, and the clinical opinion of the practitioner.